# Patient Record
Sex: FEMALE | Race: WHITE | NOT HISPANIC OR LATINO
[De-identification: names, ages, dates, MRNs, and addresses within clinical notes are randomized per-mention and may not be internally consistent; named-entity substitution may affect disease eponyms.]

---

## 2017-04-26 ENCOUNTER — APPOINTMENT (OUTPATIENT)
Dept: DERMATOLOGY | Facility: CLINIC | Age: 82
End: 2017-04-26

## 2017-04-26 VITALS — HEIGHT: 64 IN | WEIGHT: 132 LBS | BODY MASS INDEX: 22.53 KG/M2

## 2017-04-26 DIAGNOSIS — R76.8 OTHER SPECIFIED ABNORMAL IMMUNOLOGICAL FINDINGS IN SERUM: ICD-10-CM

## 2017-04-26 DIAGNOSIS — Z78.9 OTHER SPECIFIED HEALTH STATUS: ICD-10-CM

## 2017-04-26 DIAGNOSIS — Z85.828 PERSONAL HISTORY OF OTHER MALIGNANT NEOPLASM OF SKIN: ICD-10-CM

## 2017-12-01 ENCOUNTER — APPOINTMENT (OUTPATIENT)
Dept: DERMATOLOGY | Facility: CLINIC | Age: 82
End: 2017-12-01
Payer: MEDICARE

## 2017-12-01 PROCEDURE — 99214 OFFICE O/P EST MOD 30 MIN: CPT

## 2017-12-01 RX ORDER — NAPROXEN 250 MG/1
250 TABLET ORAL
Qty: 20 | Refills: 0 | Status: DISCONTINUED | COMMUNITY
Start: 2017-07-19

## 2017-12-01 RX ORDER — AMLODIPINE BESYLATE 10 MG/1
10 TABLET ORAL
Qty: 30 | Refills: 0 | Status: DISCONTINUED | COMMUNITY
Start: 2017-06-12

## 2017-12-01 RX ORDER — SULFAMETHOXAZOLE AND TRIMETHOPRIM 800; 160 MG/1; MG/1
800-160 TABLET ORAL
Qty: 20 | Refills: 0 | Status: DISCONTINUED | COMMUNITY
Start: 2017-11-21 | End: 2017-12-01

## 2017-12-01 RX ORDER — SODIUM SULFATE, POTASSIUM SULFATE, MAGNESIUM SULFATE 17.5; 3.13; 1.6 G/ML; G/ML; G/ML
17.5-3.13-1.6 SOLUTION, CONCENTRATE ORAL
Qty: 354 | Refills: 0 | Status: DISCONTINUED | COMMUNITY
Start: 2017-06-15

## 2017-12-04 ENCOUNTER — APPOINTMENT (OUTPATIENT)
Dept: DERMATOLOGY | Facility: CLINIC | Age: 82
End: 2017-12-04
Payer: MEDICARE

## 2017-12-04 DIAGNOSIS — L27.0 GENERALIZED SKIN ERUPTION DUE TO DRUGS AND MEDICAMENTS TAKEN INTERNALLY: ICD-10-CM

## 2017-12-04 PROCEDURE — 99213 OFFICE O/P EST LOW 20 MIN: CPT

## 2017-12-04 RX ORDER — BACITRACIN 500 [IU]/G
500 OINTMENT TOPICAL
Qty: 30 | Refills: 0 | Status: DISCONTINUED | COMMUNITY
Start: 2017-11-21 | End: 2017-12-04

## 2017-12-04 RX ORDER — TRIAMCINOLONE ACETONIDE 0.25 MG/G
0.03 CREAM TOPICAL
Qty: 1 | Refills: 0 | Status: DISCONTINUED | COMMUNITY
Start: 2017-12-01 | End: 2017-12-04

## 2018-07-27 PROBLEM — Z78.9 ALCOHOL USE: Status: ACTIVE | Noted: 2017-04-26

## 2018-07-27 PROBLEM — Z85.828 HISTORY OF BASAL CELL CARCINOMA: Status: RESOLVED | Noted: 2017-04-26 | Resolved: 2018-07-27

## 2018-10-09 ENCOUNTER — INPATIENT (INPATIENT)
Facility: HOSPITAL | Age: 83
LOS: 1 days | Discharge: HOME | End: 2018-10-11
Attending: SURGERY | Admitting: SURGERY
Payer: COMMERCIAL

## 2018-10-09 VITALS
OXYGEN SATURATION: 98 % | DIASTOLIC BLOOD PRESSURE: 81 MMHG | HEART RATE: 95 BPM | TEMPERATURE: 98 F | RESPIRATION RATE: 18 BRPM | SYSTOLIC BLOOD PRESSURE: 146 MMHG | WEIGHT: 130.07 LBS

## 2018-10-09 DIAGNOSIS — S22.20XA UNSPECIFIED FRACTURE OF STERNUM, INITIAL ENCOUNTER FOR CLOSED FRACTURE: ICD-10-CM

## 2018-10-09 LAB
ABO RH CONFIRMATION: SIGNIFICANT CHANGE UP
ALBUMIN SERPL ELPH-MCNC: 3.6 G/DL — SIGNIFICANT CHANGE UP (ref 3.3–5)
ALP SERPL-CCNC: 74 U/L — SIGNIFICANT CHANGE UP (ref 40–120)
ALT FLD-CCNC: 29 U/L — SIGNIFICANT CHANGE UP (ref 12–78)
ANION GAP SERPL CALC-SCNC: 7 MMOL/L — SIGNIFICANT CHANGE UP (ref 5–17)
ANION GAP SERPL CALC-SCNC: 7 MMOL/L — SIGNIFICANT CHANGE UP (ref 5–17)
APTT BLD: 29.8 SEC — SIGNIFICANT CHANGE UP (ref 27.5–37.4)
AST SERPL-CCNC: 33 U/L — SIGNIFICANT CHANGE UP (ref 15–37)
BASOPHILS # BLD AUTO: 0.13 K/UL — SIGNIFICANT CHANGE UP (ref 0–0.2)
BASOPHILS NFR BLD AUTO: 0.8 % — SIGNIFICANT CHANGE UP (ref 0–2)
BILIRUB SERPL-MCNC: 0.8 MG/DL — SIGNIFICANT CHANGE UP (ref 0.2–1.2)
BLD GP AB SCN SERPL QL: SIGNIFICANT CHANGE UP
BUN SERPL-MCNC: 14 MG/DL — SIGNIFICANT CHANGE UP (ref 7–23)
BUN SERPL-MCNC: 14 MG/DL — SIGNIFICANT CHANGE UP (ref 7–23)
CALCIUM SERPL-MCNC: 8.9 MG/DL — SIGNIFICANT CHANGE UP (ref 8.5–10.1)
CALCIUM SERPL-MCNC: 9.3 MG/DL — SIGNIFICANT CHANGE UP (ref 8.5–10.1)
CHLORIDE SERPL-SCNC: 106 MMOL/L — SIGNIFICANT CHANGE UP (ref 96–108)
CHLORIDE SERPL-SCNC: 106 MMOL/L — SIGNIFICANT CHANGE UP (ref 96–108)
CK SERPL-CCNC: 189 U/L — SIGNIFICANT CHANGE UP (ref 26–192)
CO2 SERPL-SCNC: 29 MMOL/L — SIGNIFICANT CHANGE UP (ref 22–31)
CO2 SERPL-SCNC: 30 MMOL/L — SIGNIFICANT CHANGE UP (ref 22–31)
CREAT SERPL-MCNC: 0.91 MG/DL — SIGNIFICANT CHANGE UP (ref 0.5–1.3)
CREAT SERPL-MCNC: 0.95 MG/DL — SIGNIFICANT CHANGE UP (ref 0.5–1.3)
EOSINOPHIL # BLD AUTO: 0.5 K/UL — SIGNIFICANT CHANGE UP (ref 0–0.5)
EOSINOPHIL NFR BLD AUTO: 3.2 % — SIGNIFICANT CHANGE UP (ref 0–6)
GLUCOSE SERPL-MCNC: 124 MG/DL — HIGH (ref 70–99)
GLUCOSE SERPL-MCNC: 92 MG/DL — SIGNIFICANT CHANGE UP (ref 70–99)
HCT VFR BLD CALC: 43.9 % — SIGNIFICANT CHANGE UP (ref 34.5–45)
HCT VFR BLD CALC: 45.9 % — HIGH (ref 34.5–45)
HGB BLD-MCNC: 14.4 G/DL — SIGNIFICANT CHANGE UP (ref 11.5–15.5)
HGB BLD-MCNC: 14.9 G/DL — SIGNIFICANT CHANGE UP (ref 11.5–15.5)
IMM GRANULOCYTES NFR BLD AUTO: 1.1 % — SIGNIFICANT CHANGE UP (ref 0–1.5)
INR BLD: 1.04 RATIO — SIGNIFICANT CHANGE UP (ref 0.88–1.16)
LYMPHOCYTES # BLD AUTO: 1.82 K/UL — SIGNIFICANT CHANGE UP (ref 1–3.3)
LYMPHOCYTES # BLD AUTO: 11.5 % — LOW (ref 13–44)
MAGNESIUM SERPL-MCNC: 2.1 MG/DL — SIGNIFICANT CHANGE UP (ref 1.6–2.6)
MCHC RBC-ENTMCNC: 31.2 PG — SIGNIFICANT CHANGE UP (ref 27–34)
MCHC RBC-ENTMCNC: 31.7 PG — SIGNIFICANT CHANGE UP (ref 27–34)
MCHC RBC-ENTMCNC: 32.5 GM/DL — SIGNIFICANT CHANGE UP (ref 32–36)
MCHC RBC-ENTMCNC: 32.8 GM/DL — SIGNIFICANT CHANGE UP (ref 32–36)
MCV RBC AUTO: 95 FL — SIGNIFICANT CHANGE UP (ref 80–100)
MCV RBC AUTO: 97.7 FL — SIGNIFICANT CHANGE UP (ref 80–100)
MONOCYTES # BLD AUTO: 0.78 K/UL — SIGNIFICANT CHANGE UP (ref 0–0.9)
MONOCYTES NFR BLD AUTO: 4.9 % — SIGNIFICANT CHANGE UP (ref 2–14)
NEUTROPHILS # BLD AUTO: 12.41 K/UL — HIGH (ref 1.8–7.4)
NEUTROPHILS NFR BLD AUTO: 78.5 % — HIGH (ref 43–77)
NRBC # BLD: 0 /100 WBCS — SIGNIFICANT CHANGE UP (ref 0–0)
NRBC # BLD: 0 /100 WBCS — SIGNIFICANT CHANGE UP (ref 0–0)
PHOSPHATE SERPL-MCNC: 3.2 MG/DL — SIGNIFICANT CHANGE UP (ref 2.5–4.5)
PLATELET # BLD AUTO: 330 K/UL — SIGNIFICANT CHANGE UP (ref 150–400)
PLATELET # BLD AUTO: 334 K/UL — SIGNIFICANT CHANGE UP (ref 150–400)
POTASSIUM SERPL-MCNC: 3.9 MMOL/L — SIGNIFICANT CHANGE UP (ref 3.5–5.3)
POTASSIUM SERPL-MCNC: 4.3 MMOL/L — SIGNIFICANT CHANGE UP (ref 3.5–5.3)
POTASSIUM SERPL-SCNC: 3.9 MMOL/L — SIGNIFICANT CHANGE UP (ref 3.5–5.3)
POTASSIUM SERPL-SCNC: 4.3 MMOL/L — SIGNIFICANT CHANGE UP (ref 3.5–5.3)
PROT SERPL-MCNC: 7.2 GM/DL — SIGNIFICANT CHANGE UP (ref 6–8.3)
PROTHROM AB SERPL-ACNC: 11.2 SEC — SIGNIFICANT CHANGE UP (ref 9.8–12.7)
RBC # BLD: 4.62 M/UL — SIGNIFICANT CHANGE UP (ref 3.8–5.2)
RBC # BLD: 4.7 M/UL — SIGNIFICANT CHANGE UP (ref 3.8–5.2)
RBC # FLD: 13.9 % — SIGNIFICANT CHANGE UP (ref 10.3–14.5)
RBC # FLD: 14 % — SIGNIFICANT CHANGE UP (ref 10.3–14.5)
SODIUM SERPL-SCNC: 142 MMOL/L — SIGNIFICANT CHANGE UP (ref 135–145)
SODIUM SERPL-SCNC: 143 MMOL/L — SIGNIFICANT CHANGE UP (ref 135–145)
TROPONIN I SERPL-MCNC: <0.015 NG/ML — SIGNIFICANT CHANGE UP (ref 0.01–0.04)
TROPONIN I SERPL-MCNC: <0.015 NG/ML — SIGNIFICANT CHANGE UP (ref 0.01–0.04)
TYPE + AB SCN PNL BLD: SIGNIFICANT CHANGE UP
WBC # BLD: 12.68 K/UL — HIGH (ref 3.8–10.5)
WBC # BLD: 15.81 K/UL — HIGH (ref 3.8–10.5)
WBC # FLD AUTO: 12.68 K/UL — HIGH (ref 3.8–10.5)
WBC # FLD AUTO: 15.81 K/UL — HIGH (ref 3.8–10.5)

## 2018-10-09 PROCEDURE — 73130 X-RAY EXAM OF HAND: CPT | Mod: 26,LT

## 2018-10-09 PROCEDURE — 99285 EMERGENCY DEPT VISIT HI MDM: CPT

## 2018-10-09 PROCEDURE — 93010 ELECTROCARDIOGRAM REPORT: CPT

## 2018-10-09 PROCEDURE — 72170 X-RAY EXAM OF PELVIS: CPT | Mod: 26

## 2018-10-09 PROCEDURE — 71250 CT THORAX DX C-: CPT | Mod: 26

## 2018-10-09 RX ORDER — ENOXAPARIN SODIUM 100 MG/ML
40 INJECTION SUBCUTANEOUS DAILY
Qty: 0 | Refills: 0 | Status: DISCONTINUED | OUTPATIENT
Start: 2018-10-09 | End: 2018-10-11

## 2018-10-09 RX ORDER — OXYCODONE AND ACETAMINOPHEN 5; 325 MG/1; MG/1
1 TABLET ORAL EVERY 6 HOURS
Qty: 0 | Refills: 0 | Status: DISCONTINUED | OUTPATIENT
Start: 2018-10-09 | End: 2018-10-11

## 2018-10-09 RX ORDER — MORPHINE SULFATE 50 MG/1
2 CAPSULE, EXTENDED RELEASE ORAL EVERY 4 HOURS
Qty: 0 | Refills: 0 | Status: DISCONTINUED | OUTPATIENT
Start: 2018-10-09 | End: 2018-10-11

## 2018-10-09 RX ORDER — TETANUS TOXOID, REDUCED DIPHTHERIA TOXOID AND ACELLULAR PERTUSSIS VACCINE, ADSORBED 5; 2.5; 8; 8; 2.5 [IU]/.5ML; [IU]/.5ML; UG/.5ML; UG/.5ML; UG/.5ML
0.5 SUSPENSION INTRAMUSCULAR ONCE
Qty: 0 | Refills: 0 | Status: COMPLETED | OUTPATIENT
Start: 2018-10-09 | End: 2018-10-09

## 2018-10-09 RX ORDER — ACETAMINOPHEN 500 MG
650 TABLET ORAL ONCE
Qty: 0 | Refills: 0 | Status: COMPLETED | OUTPATIENT
Start: 2018-10-09 | End: 2018-10-09

## 2018-10-09 RX ORDER — LIDOCAINE 4 G/100G
1 CREAM TOPICAL DAILY
Qty: 0 | Refills: 0 | Status: DISCONTINUED | OUTPATIENT
Start: 2018-10-09 | End: 2018-10-11

## 2018-10-09 RX ORDER — PANTOPRAZOLE SODIUM 20 MG/1
40 TABLET, DELAYED RELEASE ORAL DAILY
Qty: 0 | Refills: 0 | Status: DISCONTINUED | OUTPATIENT
Start: 2018-10-09 | End: 2018-10-11

## 2018-10-09 RX ORDER — IBUPROFEN 200 MG
600 TABLET ORAL EVERY 8 HOURS
Qty: 0 | Refills: 0 | Status: DISCONTINUED | OUTPATIENT
Start: 2018-10-09 | End: 2018-10-11

## 2018-10-09 RX ADMIN — TETANUS TOXOID, REDUCED DIPHTHERIA TOXOID AND ACELLULAR PERTUSSIS VACCINE, ADSORBED 0.5 MILLILITER(S): 5; 2.5; 8; 8; 2.5 SUSPENSION INTRAMUSCULAR at 13:37

## 2018-10-09 RX ADMIN — Medication 650 MILLIGRAM(S): at 14:01

## 2018-10-09 RX ADMIN — Medication 650 MILLIGRAM(S): at 13:30

## 2018-10-09 NOTE — ED PROVIDER NOTE - SKIN, MLM
+right hand second MCP joint abrasion +left hand ecchymosis and swelling to fourth and fifth MCP joint
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
## 2018-10-09 NOTE — H&P ADULT - NSHPREVIEWOFSYSTEMS_GEN_ALL_CORE
+chest pain  +sob  +hand pain and swelling ROS:.  [X] A ten-point review of systems was otherwise negative except as noted.  Systemic:	[ ] Fever	[ ] Chills	[ ] Night sweats    [ ] Fatigue	[ ] Other  [] Cardiovascular:  [] Pulmonary:  [] Renal/Urologic:  [] Gastrointestinal: abdominal pain, vomiting  [] Metabolic:  [] Neurologic:  [] Hematologic:  [] ENT:  [] Ophthalmologic:  [] Musculoskeletal:    [ ] Due to altered mental status/intubation, subjective information were not able to be obtained from the patient. History was obtained, to the extent possible, from review of the chart and collateral sources of information.  +chest pain  +sob  +hand pain and swelling
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
## 2018-10-09 NOTE — ED ADULT NURSE NOTE - NSIMPLEMENTINTERV_GEN_ALL_ED
Implemented All Fall with Harm Risk Interventions:  [place] to call system. Call bell, personal items and telephone within reach. Instruct patient to call for assistance. Room bathroom lighting operational. Non-slip footwear when patient is off stretcher. Physically safe environment: no spills, clutter or unnecessary equipment. Stretcher in lowest position, wheels locked, appropriate side rails in place. Provide visual cue, wrist band, yellow gown, etc. Monitor gait and stability. Monitor for mental status changes and reorient to person, place, and time. Review medications for side effects contributing to fall risk. Reinforce activity limits and safety measures with patient and family. Provide visual clues: red socks.
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
## 2018-10-09 NOTE — ED ADULT NURSE REASSESSMENT NOTE - COMFORT CARE
plan of care explained
surgery at bedside with patient and . poc provided/plan of care explained
plan of care explained
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
## 2018-10-10 LAB
ANION GAP SERPL CALC-SCNC: 8 MMOL/L — SIGNIFICANT CHANGE UP (ref 5–17)
BUN SERPL-MCNC: 14 MG/DL — SIGNIFICANT CHANGE UP (ref 7–23)
CALCIUM SERPL-MCNC: 8.6 MG/DL — SIGNIFICANT CHANGE UP (ref 8.5–10.1)
CHLORIDE SERPL-SCNC: 107 MMOL/L — SIGNIFICANT CHANGE UP (ref 96–108)
CO2 SERPL-SCNC: 27 MMOL/L — SIGNIFICANT CHANGE UP (ref 22–31)
CREAT SERPL-MCNC: 0.78 MG/DL — SIGNIFICANT CHANGE UP (ref 0.5–1.3)
GLUCOSE SERPL-MCNC: 95 MG/DL — SIGNIFICANT CHANGE UP (ref 70–99)
HCT VFR BLD CALC: 42.9 % — SIGNIFICANT CHANGE UP (ref 34.5–45)
HGB BLD-MCNC: 13.7 G/DL — SIGNIFICANT CHANGE UP (ref 11.5–15.5)
MCHC RBC-ENTMCNC: 31.1 PG — SIGNIFICANT CHANGE UP (ref 27–34)
MCHC RBC-ENTMCNC: 31.9 GM/DL — LOW (ref 32–36)
MCV RBC AUTO: 97.5 FL — SIGNIFICANT CHANGE UP (ref 80–100)
NRBC # BLD: 0 /100 WBCS — SIGNIFICANT CHANGE UP (ref 0–0)
PLATELET # BLD AUTO: 296 K/UL — SIGNIFICANT CHANGE UP (ref 150–400)
POTASSIUM SERPL-MCNC: 3.8 MMOL/L — SIGNIFICANT CHANGE UP (ref 3.5–5.3)
POTASSIUM SERPL-SCNC: 3.8 MMOL/L — SIGNIFICANT CHANGE UP (ref 3.5–5.3)
RBC # BLD: 4.4 M/UL — SIGNIFICANT CHANGE UP (ref 3.8–5.2)
RBC # FLD: 13.9 % — SIGNIFICANT CHANGE UP (ref 10.3–14.5)
SODIUM SERPL-SCNC: 142 MMOL/L — SIGNIFICANT CHANGE UP (ref 135–145)
TROPONIN I SERPL-MCNC: <0.015 NG/ML — SIGNIFICANT CHANGE UP (ref 0.01–0.04)
TROPONIN I SERPL-MCNC: <0.015 NG/ML — SIGNIFICANT CHANGE UP (ref 0.01–0.04)
WBC # BLD: 10.52 K/UL — HIGH (ref 3.8–10.5)
WBC # FLD AUTO: 10.52 K/UL — HIGH (ref 3.8–10.5)

## 2018-10-10 PROCEDURE — 71045 X-RAY EXAM CHEST 1 VIEW: CPT | Mod: 26

## 2018-10-10 PROCEDURE — 93306 TTE W/DOPPLER COMPLETE: CPT | Mod: 26

## 2018-10-10 PROCEDURE — 73140 X-RAY EXAM OF FINGER(S): CPT | Mod: 26,LT

## 2018-10-10 PROCEDURE — 93010 ELECTROCARDIOGRAM REPORT: CPT

## 2018-10-10 RX ORDER — ACETAMINOPHEN 500 MG
650 TABLET ORAL EVERY 6 HOURS
Qty: 0 | Refills: 0 | Status: DISCONTINUED | OUTPATIENT
Start: 2018-10-10 | End: 2018-10-11

## 2018-10-10 RX ADMIN — Medication 650 MILLIGRAM(S): at 00:43

## 2018-10-10 RX ADMIN — PANTOPRAZOLE SODIUM 40 MILLIGRAM(S): 20 TABLET, DELAYED RELEASE ORAL at 12:46

## 2018-10-10 RX ADMIN — Medication 650 MILLIGRAM(S): at 01:42

## 2018-10-10 RX ADMIN — LIDOCAINE 1 PATCH: 4 CREAM TOPICAL at 12:46

## 2018-10-10 RX ADMIN — ENOXAPARIN SODIUM 40 MILLIGRAM(S): 100 INJECTION SUBCUTANEOUS at 12:46

## 2018-10-10 NOTE — PHYSICAL THERAPY INITIAL EVALUATION ADULT - GENERAL OBSERVATIONS, REHAB EVAL
HM; L hand splinted; pt rec'd seated in chair; HR 98; O2 Sat 98%; pain level 5/10; RN Pili made aware
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
## 2018-10-10 NOTE — PHYSICAL THERAPY INITIAL EVALUATION ADULT - MODALITIES TREATMENT COMMENTS
pt left seated in chair post Eval; chair alarm donned; HM in place; callbell in reach; pt instructed not to get up alone; call nursing for assist; ezequiel well; pain persists; RN Pili mari
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
## 2018-10-10 NOTE — PHYSICAL THERAPY INITIAL EVALUATION ADULT - CRITERIA FOR SKILLED THERAPEUTIC INTERVENTIONS
pt does not require skilled PT intervention @ this time; recommend daily OOB / amb as ezequiel on nursing floor care
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
## 2018-10-11 ENCOUNTER — TRANSCRIPTION ENCOUNTER (OUTPATIENT)
Age: 83
End: 2018-10-11

## 2018-10-11 VITALS
TEMPERATURE: 99 F | DIASTOLIC BLOOD PRESSURE: 67 MMHG | OXYGEN SATURATION: 97 % | SYSTOLIC BLOOD PRESSURE: 122 MMHG | HEART RATE: 102 BPM | RESPIRATION RATE: 17 BRPM

## 2018-10-11 RX ORDER — LIDOCAINE 4 G/100G
1 CREAM TOPICAL
Qty: 0 | Refills: 0 | COMMUNITY
Start: 2018-10-11

## 2018-10-11 RX ORDER — IBUPROFEN 200 MG
1 TABLET ORAL
Qty: 0 | Refills: 0 | COMMUNITY
Start: 2018-10-11

## 2018-10-11 RX ORDER — OXYCODONE HYDROCHLORIDE 5 MG/1
1 TABLET ORAL
Qty: 20 | Refills: 0 | OUTPATIENT
Start: 2018-10-11 | End: 2018-10-15

## 2018-10-11 RX ADMIN — LIDOCAINE 1 PATCH: 4 CREAM TOPICAL at 13:04

## 2018-10-11 RX ADMIN — LIDOCAINE 1 PATCH: 4 CREAM TOPICAL at 01:00

## 2018-10-11 NOTE — DISCHARGE NOTE ADULT - NS AS ACTIVITY OBS
No Heavy lifting/straining/Showering allowed/Walking-Indoors allowed/Stairs allowed/Do not drive or operate machinery/Walking-Outdoors allowed
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
## 2018-10-11 NOTE — DISCHARGE NOTE ADULT - PLAN OF CARE
pain control Seek medical attention of develops worsening headache, nausea, vomiting, seizure, any focal neurological complaint, Pain not controlled with medication, difficulty breathing or fever. No Heavy lifting, pushing, pulling or excessive physical activity for 4 weeks. Incentive spirometry. Fall precautions. call offices for follow up appointments. Follow up with surgery if pain is not not better in 4 weeks. healing keep splint in place, keep left arm elevated, follow up with Dr Bell.

## 2018-10-11 NOTE — DISCHARGE NOTE ADULT - MEDICATION SUMMARY - MEDICATIONS TO TAKE
I will START or STAY ON the medications listed below when I get home from the hospital:    ibuprofen 600 mg oral tablet  -- 1 tab(s) by mouth every 8 hours, As needed, Mild Pain (1 - 3)  -- Indication: For pain    Tylenol 325 mg oral tablet  -- 2 tab(s) by mouth every 4 hours  -- Indication: For pain    oxyCODONE 5 mg oral tablet  -- 1 tab(s) by mouth every 6 hours, As Needed  -- Indication: For pain    lidocaine 5% topical film  -- Apply on skin to affected area once a day 12 hour son 12 hours off  -- Indication: For pain    Dulcolax Stool Softener 100 mg oral capsule  -- 1 cap(s) by mouth 2 times a day  -- Indication: For Constipatiin

## 2018-10-11 NOTE — DISCHARGE NOTE ADULT - CARE PROVIDER_API CALL
Margie Mccollum), Plastic Surgery; Surgery  [address]  [address]  [place] [address]  Phone: [phone]  Fax: [phone]    Jennifer Wilcox), Surgery; Surgical Critical Care  [address]  [address]  [place] [address]  Phone: [phone]  Fax: [phone]    PMD,   Phone: (   )    -  Fax: (   )    -

## 2018-10-11 NOTE — DISCHARGE NOTE ADULT - HOSPITAL COURSE
Pt with sternal fracture, left metacarpal fracture, pain controlled O2 sat stable, follow up with hand surgery

## 2018-10-11 NOTE — DISCHARGE NOTE ADULT - PATIENT PORTAL LINK FT
You can access the [facility] Patient Portal, offered by [facility], by registering with the following website: [email or web address]

## 2018-10-11 NOTE — DISCHARGE NOTE ADULT - CARE PLAN
no Principal Discharge DX:	Sternal fracture  Goal:	pain control  Assessment and plan of treatment:	Seek medical attention of develops worsening headache, nausea, vomiting, seizure, any focal neurological complaint, Pain not controlled with medication, difficulty breathing or fever. No Heavy lifting, pushing, pulling or excessive physical activity for 4 weeks. Incentive spirometry. Fall precautions. call offices for follow up appointments. Follow up with surgery if pain is not not better in 4 weeks.  Secondary Diagnosis:	Closed nondisplaced fracture of fifth metacarpal bone, unspecified portion of metacarpal, unspecified laterality, initial encounter  Goal:	healing  Assessment and plan of treatment:	keep splint in place, keep left arm elevated, follow up with Dr Bell.
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
## 2018-10-17 DIAGNOSIS — S22.20XA UNSPECIFIED FRACTURE OF STERNUM, INITIAL ENCOUNTER FOR CLOSED FRACTURE: ICD-10-CM

## 2018-10-17 DIAGNOSIS — E78.5 HYPERLIPIDEMIA, UNSPECIFIED: ICD-10-CM
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
## 2018-10-17 DIAGNOSIS — S62.307A UNSPECIFIED FRACTURE OF FIFTH METACARPAL BONE, LEFT HAND, INITIAL ENCOUNTER FOR CLOSED FRACTURE: ICD-10-CM
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
## 2018-10-25 ENCOUNTER — OUTPATIENT (OUTPATIENT)
Dept: OUTPATIENT SERVICES | Facility: HOSPITAL | Age: 83
LOS: 1 days | Discharge: HOME | End: 2018-10-25

## 2018-10-25 VITALS
HEIGHT: 65 IN | TEMPERATURE: 97 F | OXYGEN SATURATION: 97 % | HEART RATE: 88 BPM | RESPIRATION RATE: 16 BRPM | WEIGHT: 125 LBS | DIASTOLIC BLOOD PRESSURE: 79 MMHG | SYSTOLIC BLOOD PRESSURE: 146 MMHG

## 2018-10-25 VITALS
DIASTOLIC BLOOD PRESSURE: 95 MMHG | RESPIRATION RATE: 16 BRPM | HEART RATE: 100 BPM | OXYGEN SATURATION: 99 % | SYSTOLIC BLOOD PRESSURE: 159 MMHG

## 2018-10-25 DIAGNOSIS — Z98.890 OTHER SPECIFIED POSTPROCEDURAL STATES: Chronic | ICD-10-CM

## 2018-10-25 RX ORDER — DOCUSATE SODIUM 100 MG
1 CAPSULE ORAL
Qty: 0 | Refills: 0 | COMMUNITY

## 2018-10-25 RX ORDER — FENTANYL CITRATE 50 UG/ML
25 INJECTION INTRAVENOUS
Qty: 0 | Refills: 0 | Status: DISCONTINUED | OUTPATIENT
Start: 2018-10-25 | End: 2018-10-25

## 2018-10-25 RX ORDER — ACETAMINOPHEN 500 MG
565 TABLET ORAL EVERY 4 HOURS
Qty: 0 | Refills: 0 | Status: DISCONTINUED | OUTPATIENT
Start: 2018-10-25 | End: 2018-11-09

## 2018-10-25 RX ORDER — SODIUM CHLORIDE 9 MG/ML
1000 INJECTION, SOLUTION INTRAVENOUS
Qty: 0 | Refills: 0 | Status: DISCONTINUED | OUTPATIENT
Start: 2018-10-25 | End: 2018-10-25

## 2018-10-25 RX ORDER — OXYCODONE HYDROCHLORIDE 5 MG/1
5 TABLET ORAL ONCE
Qty: 0 | Refills: 0 | Status: DISCONTINUED | OUTPATIENT
Start: 2018-10-25 | End: 2018-10-25

## 2018-10-25 RX ORDER — ACETAMINOPHEN 500 MG
2 TABLET ORAL
Qty: 0 | Refills: 0 | COMMUNITY

## 2018-10-25 RX ORDER — ACETAMINOPHEN 500 MG
1000 TABLET ORAL ONCE
Qty: 0 | Refills: 0 | Status: COMPLETED | OUTPATIENT
Start: 2018-10-25 | End: 2018-10-25

## 2018-10-25 RX ORDER — ONDANSETRON 8 MG/1
4 TABLET, FILM COATED ORAL ONCE
Qty: 0 | Refills: 0 | Status: DISCONTINUED | OUTPATIENT
Start: 2018-10-25 | End: 2018-10-25

## 2018-10-25 RX ORDER — MEPERIDINE HYDROCHLORIDE 50 MG/ML
12.5 INJECTION INTRAMUSCULAR; INTRAVENOUS; SUBCUTANEOUS
Qty: 0 | Refills: 0 | Status: DISCONTINUED | OUTPATIENT
Start: 2018-10-25 | End: 2018-10-25

## 2018-10-25 RX ORDER — OXYCODONE HYDROCHLORIDE 5 MG/1
1 TABLET ORAL
Qty: 0 | Refills: 0 | COMMUNITY

## 2018-10-25 RX ADMIN — Medication 1000 MILLIGRAM(S): at 12:53

## 2018-10-25 RX ADMIN — Medication 400 MILLIGRAM(S): at 12:48

## 2018-10-25 RX ADMIN — SODIUM CHLORIDE 75 MILLILITER(S): 9 INJECTION, SOLUTION INTRAVENOUS at 12:51

## 2018-10-25 NOTE — BRIEF OPERATIVE NOTE - PROCEDURE
<<-----Click on this checkbox to enter Procedure Fracture repair  10/25/2018  [address]  Active  [facility]

## 2018-10-25 NOTE — ASU DISCHARGE PLAN (ADULT/PEDIATRIC). - SPECIAL INSTRUCTIONS
1. DO NOT use limb; elevate limb at all times  2. Keep dressing dry  3. Call MD to make follow-up appointment for next week

## 2018-10-25 NOTE — PROGRESS NOTE ADULT - SUBJECTIVE AND OBJECTIVE BOX
Patient's case discussed with Dr. Aragon (anesthesia) on Oct 22, 2018.  Patient was cleared for OR today from an anesthesia perspective; recent admission.  Data reviewed.

## 2018-10-25 NOTE — ASU DISCHARGE PLAN (ADULT/PEDIATRIC). - NOTIFY
Excessive Diarrhea/Fever greater than 101/Bleeding that does not stop/Persistent Nausea and Vomiting/Numbness, color, or temperature change to extremity/Unable to Urinate
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
## 2018-11-05 DIAGNOSIS — S62.337A DISPLACED FRACTURE OF NECK OF FIFTH METACARPAL BONE, LEFT HAND, INITIAL ENCOUNTER FOR CLOSED FRACTURE: ICD-10-CM
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
## 2018-12-03 ENCOUNTER — APPOINTMENT (OUTPATIENT)
Dept: DERMATOLOGY | Facility: CLINIC | Age: 83
End: 2018-12-03

## 2018-12-22 NOTE — ASU PATIENT PROFILE, ADULT - BLOOD AVOIDANCE/RESTRICTIONS, PROFILE
Sushil Da Silva), Otolaryngology  [address] [place] [address]  Phone: [phone]  Fax: [phone] none

## 2019-03-18 ENCOUNTER — APPOINTMENT (OUTPATIENT)
Dept: DERMATOLOGY | Facility: CLINIC | Age: 84
End: 2019-03-18
Payer: MEDICARE

## 2019-03-18 VITALS — HEIGHT: 64 IN | WEIGHT: 125 LBS | BODY MASS INDEX: 21.34 KG/M2

## 2019-03-18 DIAGNOSIS — L57.0 ACTINIC KERATOSIS: ICD-10-CM

## 2019-03-18 PROCEDURE — 17003 DESTRUCT PREMALG LES 2-14: CPT

## 2019-03-18 PROCEDURE — 17000 DESTRUCT PREMALG LESION: CPT

## 2019-03-18 PROCEDURE — 99213 OFFICE O/P EST LOW 20 MIN: CPT | Mod: 25

## 2019-03-18 NOTE — PHYSICAL EXAM
[Alert] : alert [Oriented x 3] : ~L oriented x 3 [Well Nourished] : well nourished [Full Body Skin Exam Performed] : performed [Eyelids] : Eyelids [Ears] : Ears [Lips] : Lips [Neck] : Neck [FreeTextEntry3] : A full skin exam was performed including the scalp, face, neck, chest, abdomen, back, buttocks, upper extremities and lower extremities.  The patient declined examination of the breasts and genitalia.  \par The exam did not reveal any evidence of skin cancer, showing only the following benign growths:\par [place] pigmented nevi.\par Seborrheic keratoses.\par \par Keratotic papules, left cheek and right cheek.\par

## 2019-03-18 NOTE — HISTORY OF PRESENT ILLNESS
[FreeTextEntry1] : Patient presents for skin examination. [de-identified] : Denies new, changing, bleeding or tender lesions on the skin over the past year.\par , Oziel,  in .

## 2019-07-07 PROBLEM — L27.0 ERUPTION DUE TO DRUG: Status: ACTIVE | Noted: 2017-12-01

## 2019-10-23 ENCOUNTER — APPOINTMENT (OUTPATIENT)
Dept: DERMATOLOGY | Facility: CLINIC | Age: 84
End: 2019-10-23
Payer: MEDICARE

## 2019-10-23 VITALS — BODY MASS INDEX: 21.63 KG/M2 | WEIGHT: 126 LBS

## 2019-10-23 DIAGNOSIS — D22.9 MELANOCYTIC NEVI, UNSPECIFIED: ICD-10-CM

## 2019-10-23 PROCEDURE — 99213 OFFICE O/P EST LOW 20 MIN: CPT

## 2019-10-23 NOTE — PHYSICAL EXAM
[Oriented x 3] : ~L oriented x 3 [Alert] : alert [Full Body Skin Exam Performed] : performed [Well Nourished] : well nourished [FreeTextEntry3] : A full skin exam was performed including the scalp, face (including the lips, ears, nose and eyes), neck, chest, breasts, abdomen, back, buttocks, upper extremities and lower extremities.  The patient declined examination of the genitalia.  \par The exam revealed the following benign growths:\par [place] pigmented nevi.\par Seborrheic keratoses.\par \par

## 2019-10-23 NOTE — HISTORY OF PRESENT ILLNESS
[FreeTextEntry1] : Patient presents for skin examination. [de-identified] : Denies new, changing, bleeding or tender lesions on the skin over the past 6 months.\par

## 2020-07-28 ENCOUNTER — APPOINTMENT (OUTPATIENT)
Dept: DERMATOLOGY | Facility: CLINIC | Age: 85
End: 2020-07-28
Payer: MEDICARE

## 2020-07-28 DIAGNOSIS — L82.1 OTHER SEBORRHEIC KERATOSIS: ICD-10-CM

## 2020-07-28 PROCEDURE — 99213 OFFICE O/P EST LOW 20 MIN: CPT

## 2020-07-29 PROBLEM — L82.1 SEBORRHEIC KERATOSIS: Status: ACTIVE | Noted: 2017-04-26

## 2022-11-30 NOTE — ASU PATIENT PROFILE, ADULT - NS SC CAGE ALCOHOL EYE OPENER
Patient sister May called to cancel patient Nephrology Consult appointment ref by Dr Kellee Murrell for CKD because of patient having hip surgery done  Patient sister stated that she will call us back when patient is discharged from her hip surgery  Patient sister understood and was okay with it  no

## 2023-02-17 ENCOUNTER — INPATIENT (INPATIENT)
Facility: HOSPITAL | Age: 88
LOS: 2 days | Discharge: HOME | DRG: 603 | End: 2023-02-20
Attending: FAMILY MEDICINE | Admitting: STUDENT IN AN ORGANIZED HEALTH CARE EDUCATION/TRAINING PROGRAM
Payer: MEDICARE

## 2023-02-17 VITALS — HEIGHT: 65 IN | WEIGHT: 130.07 LBS

## 2023-02-17 DIAGNOSIS — L03.115 CELLULITIS OF RIGHT LOWER LIMB: ICD-10-CM

## 2023-02-17 DIAGNOSIS — Z98.890 OTHER SPECIFIED POSTPROCEDURAL STATES: Chronic | ICD-10-CM

## 2023-02-17 LAB
ADD ON TEST-SPECIMEN IN LAB: SIGNIFICANT CHANGE UP
ALBUMIN SERPL ELPH-MCNC: 3 G/DL — LOW (ref 3.3–5)
ALP SERPL-CCNC: 69 U/L — SIGNIFICANT CHANGE UP (ref 40–120)
ALT FLD-CCNC: 14 U/L — SIGNIFICANT CHANGE UP (ref 12–78)
ANION GAP SERPL CALC-SCNC: 4 MMOL/L — LOW (ref 5–17)
APTT BLD: 29.7 SEC — SIGNIFICANT CHANGE UP (ref 27.5–35.5)
AST SERPL-CCNC: 18 U/L — SIGNIFICANT CHANGE UP (ref 15–37)
BASOPHILS # BLD AUTO: 0 K/UL — SIGNIFICANT CHANGE UP (ref 0–0.2)
BASOPHILS NFR BLD AUTO: 0 % — SIGNIFICANT CHANGE UP (ref 0–2)
BILIRUB SERPL-MCNC: 1 MG/DL — SIGNIFICANT CHANGE UP (ref 0.2–1.2)
BUN SERPL-MCNC: 17 MG/DL — SIGNIFICANT CHANGE UP (ref 7–23)
CALCIUM SERPL-MCNC: 9.1 MG/DL — SIGNIFICANT CHANGE UP (ref 8.5–10.1)
CHLORIDE SERPL-SCNC: 105 MMOL/L — SIGNIFICANT CHANGE UP (ref 96–108)
CO2 SERPL-SCNC: 27 MMOL/L — SIGNIFICANT CHANGE UP (ref 22–31)
CREAT SERPL-MCNC: 0.9 MG/DL — SIGNIFICANT CHANGE UP (ref 0.5–1.3)
EGFR: 61 ML/MIN/1.73M2 — SIGNIFICANT CHANGE UP
EOSINOPHIL # BLD AUTO: 0 K/UL — SIGNIFICANT CHANGE UP (ref 0–0.5)
EOSINOPHIL NFR BLD AUTO: 0 % — SIGNIFICANT CHANGE UP (ref 0–6)
FLUAV AG NPH QL: SIGNIFICANT CHANGE UP
FLUBV AG NPH QL: SIGNIFICANT CHANGE UP
GLUCOSE SERPL-MCNC: 110 MG/DL — HIGH (ref 70–99)
HCT VFR BLD CALC: 45.8 % — HIGH (ref 34.5–45)
HGB BLD-MCNC: 15 G/DL — SIGNIFICANT CHANGE UP (ref 11.5–15.5)
INR BLD: 1.14 RATIO — SIGNIFICANT CHANGE UP (ref 0.88–1.16)
LACTATE SERPL-SCNC: 1.6 MMOL/L — SIGNIFICANT CHANGE UP (ref 0.7–2)
LYMPHOCYTES # BLD AUTO: 1.29 K/UL — SIGNIFICANT CHANGE UP (ref 1–3.3)
LYMPHOCYTES # BLD AUTO: 9 % — LOW (ref 13–44)
MCHC RBC-ENTMCNC: 32.5 PG — SIGNIFICANT CHANGE UP (ref 27–34)
MCHC RBC-ENTMCNC: 32.8 GM/DL — SIGNIFICANT CHANGE UP (ref 32–36)
MCV RBC AUTO: 99.1 FL — SIGNIFICANT CHANGE UP (ref 80–100)
MONOCYTES # BLD AUTO: 1.58 K/UL — HIGH (ref 0–0.9)
MONOCYTES NFR BLD AUTO: 11 % — SIGNIFICANT CHANGE UP (ref 2–14)
NEUTROPHILS # BLD AUTO: 11.47 K/UL — HIGH (ref 1.8–7.4)
NEUTROPHILS NFR BLD AUTO: 80 % — HIGH (ref 43–77)
NRBC # BLD: SIGNIFICANT CHANGE UP /100 WBCS (ref 0–0)
PLATELET # BLD AUTO: 307 K/UL — SIGNIFICANT CHANGE UP (ref 150–400)
POTASSIUM SERPL-MCNC: 4.2 MMOL/L — SIGNIFICANT CHANGE UP (ref 3.5–5.3)
POTASSIUM SERPL-SCNC: 4.2 MMOL/L — SIGNIFICANT CHANGE UP (ref 3.5–5.3)
PROT SERPL-MCNC: 7.8 GM/DL — SIGNIFICANT CHANGE UP (ref 6–8.3)
PROTHROM AB SERPL-ACNC: 13.2 SEC — SIGNIFICANT CHANGE UP (ref 10.5–13.4)
RBC # BLD: 4.62 M/UL — SIGNIFICANT CHANGE UP (ref 3.8–5.2)
RBC # FLD: 13 % — SIGNIFICANT CHANGE UP (ref 10.3–14.5)
RSV RNA NPH QL NAA+NON-PROBE: SIGNIFICANT CHANGE UP
SARS-COV-2 RNA SPEC QL NAA+PROBE: SIGNIFICANT CHANGE UP
SODIUM SERPL-SCNC: 136 MMOL/L — SIGNIFICANT CHANGE UP (ref 135–145)
WBC # BLD: 14.34 K/UL — HIGH (ref 3.8–10.5)
WBC # FLD AUTO: 14.34 K/UL — HIGH (ref 3.8–10.5)

## 2023-02-17 PROCEDURE — 99222 1ST HOSP IP/OBS MODERATE 55: CPT | Mod: GC

## 2023-02-17 PROCEDURE — 99285 EMERGENCY DEPT VISIT HI MDM: CPT | Mod: FS

## 2023-02-17 PROCEDURE — 97116 GAIT TRAINING THERAPY: CPT | Mod: GP

## 2023-02-17 PROCEDURE — 93010 ELECTROCARDIOGRAM REPORT: CPT

## 2023-02-17 PROCEDURE — 97163 PT EVAL HIGH COMPLEX 45 MIN: CPT | Mod: GP

## 2023-02-17 PROCEDURE — 80048 BASIC METABOLIC PNL TOTAL CA: CPT

## 2023-02-17 PROCEDURE — 84100 ASSAY OF PHOSPHORUS: CPT

## 2023-02-17 PROCEDURE — 85027 COMPLETE CBC AUTOMATED: CPT

## 2023-02-17 PROCEDURE — 36415 COLL VENOUS BLD VENIPUNCTURE: CPT

## 2023-02-17 PROCEDURE — 80053 COMPREHEN METABOLIC PANEL: CPT

## 2023-02-17 PROCEDURE — 93971 EXTREMITY STUDY: CPT | Mod: 26,RT

## 2023-02-17 PROCEDURE — 83735 ASSAY OF MAGNESIUM: CPT

## 2023-02-17 PROCEDURE — 83605 ASSAY OF LACTIC ACID: CPT

## 2023-02-17 PROCEDURE — 73562 X-RAY EXAM OF KNEE 3: CPT | Mod: 26,RT

## 2023-02-17 RX ORDER — IBUPROFEN 200 MG
400 TABLET ORAL EVERY 6 HOURS
Refills: 0 | Status: DISCONTINUED | OUTPATIENT
Start: 2023-02-17 | End: 2023-02-20

## 2023-02-17 RX ORDER — CEPHALEXIN 500 MG
500 CAPSULE ORAL ONCE
Refills: 0 | Status: COMPLETED | OUTPATIENT
Start: 2023-02-17 | End: 2023-02-17

## 2023-02-17 RX ORDER — ONDANSETRON 8 MG/1
1 TABLET, FILM COATED ORAL
Qty: 12 | Refills: 0
Start: 2023-02-17 | End: 2023-02-20

## 2023-02-17 RX ORDER — LANOLIN ALCOHOL/MO/W.PET/CERES
3 CREAM (GRAM) TOPICAL AT BEDTIME
Refills: 0 | Status: DISCONTINUED | OUTPATIENT
Start: 2023-02-17 | End: 2023-02-20

## 2023-02-17 RX ORDER — CEFTRIAXONE 500 MG/1
1000 INJECTION, POWDER, FOR SOLUTION INTRAMUSCULAR; INTRAVENOUS ONCE
Refills: 0 | Status: COMPLETED | OUTPATIENT
Start: 2023-02-17 | End: 2023-02-17

## 2023-02-17 RX ORDER — ACETAMINOPHEN 500 MG
650 TABLET ORAL EVERY 6 HOURS
Refills: 0 | Status: DISCONTINUED | OUTPATIENT
Start: 2023-02-17 | End: 2023-02-20

## 2023-02-17 RX ORDER — SODIUM CHLORIDE 9 MG/ML
1000 INJECTION INTRAMUSCULAR; INTRAVENOUS; SUBCUTANEOUS
Refills: 0 | Status: DISCONTINUED | OUTPATIENT
Start: 2023-02-17 | End: 2023-02-20

## 2023-02-17 RX ORDER — ONDANSETRON 8 MG/1
4 TABLET, FILM COATED ORAL EVERY 8 HOURS
Refills: 0 | Status: DISCONTINUED | OUTPATIENT
Start: 2023-02-17 | End: 2023-02-20

## 2023-02-17 RX ORDER — CEFTRIAXONE 500 MG/1
1000 INJECTION, POWDER, FOR SOLUTION INTRAMUSCULAR; INTRAVENOUS ONCE
Refills: 0 | Status: DISCONTINUED | OUTPATIENT
Start: 2023-02-17 | End: 2023-02-17

## 2023-02-17 RX ADMIN — Medication 110 MILLIGRAM(S): at 23:16

## 2023-02-17 RX ADMIN — CEFTRIAXONE 1000 MILLIGRAM(S): 500 INJECTION, POWDER, FOR SOLUTION INTRAMUSCULAR; INTRAVENOUS at 14:14

## 2023-02-17 NOTE — H&P ADULT - ASSESSMENT
89 yo F w PMH of B/L LE varicose veins s/p ligation, Lyme disease 5 years ago presents to  ED due to RLE swelling, pain and erythema. Pt states that last week she notices her RLE was hurting, 3 days afterwards started becoming red and swollen.  Being admitted for further management of popliteal cyst and cellulitis.     #Popliteal Cyst  -Pt presenting w RLE pain, erythema and edema, sent in from Ortho to rule out DVT   -RLE US negative for DVT but demonstrated large popliteal cyst   -Ortho referral for possible intervention   -Patient in mild pain, Tylenol and Ibuprofen as needed     #Cellulitis   -Overlying skin erythema, edema and warmth may be 2/2 cellulitis   -Labs notable for leukocytosis, patient afebrile, lactate WNL, does not meet sepsis criteria   -S/P Cephalexin and CTX in ED   -Pt states she previously had Adverse reactions to penicillin antibiotics   -Hesitant to start Vancomycin, will start on Doxycycline 100 mg PO BID x 7 days   -Switch to IV abx if patient becomes febrile or meets sepsis criteria     #Diet   -Regular     #DVT PPX   -SCD’s, caution in RLE     #Advanced Directives   -Discussed intubation and resuscitation, has not had the conversation w her family, would like to think about further, has not appointed a HCP, has 8 adult children. Encouraged to initiate conversation w her family. FULL CODE.      *Above discussed w Dr. Campbell  89 yo F w PMH of B/L LE varicose veins s/p ligation, Lyme disease 5 years ago presents to  ED due to RLE swelling, pain and erythema. Pt states that last week she notices her RLE was hurting, 3 days afterwards started becoming red and swollen.  Being admitted for further management of popliteal cyst and cellulitis.     #Popliteal Cyst  -Pt presenting w RLE pain, erythema and edema, sent in from Ortho to rule out DVT   -RLE US negative for DVT but demonstrated large popliteal cyst   -Ortho referral for possible intervention   -Patient in mild pain, Tylenol and Ibuprofen as needed   -Recommend MRI for further visualization, differential includes cyst rupture vs rule out malignancy    #Cellulitis   -Overlying skin erythema, edema and warmth may be 2/2 cellulitis   -Labs notable for leukocytosis, patient afebrile, lactate WNL, does not meet sepsis criteria   -S/P Cephalexin and CTX in ED   -Pt states she previously had Adverse reactions to penicillin antibiotics   -Hesitant to start Vancomycin, will start on Doxycycline 100 mg PO BID x 7 days   -Switch to IV abx if patient becomes febrile or meets sepsis criteria     #Diet   -Regular     #DVT PPX   -SCD’s, caution in RLE     #Advanced Directives   -Discussed intubation and resuscitation, has not had the conversation w her family, would like to think about further, has not appointed a HCP, has 8 adult children. Encouraged to initiate conversation w her family. FULL CODE.      *Above discussed w Dr. Campbell

## 2023-02-17 NOTE — ED STATDOCS - MUSCULOSKELETAL, MLM
Right lower leg diffusely swollen. TTP to calf and anterior tibia. Skin erythematous. Swelling extends to ankle and top of foot.

## 2023-02-17 NOTE — ED STATDOCS - ATTENDING APP SHARED VISIT CONTRIBUTION OF CARE
I, Tahir Watters, DO personally saw the patient with MIN.  I have personally performed a face to face diagnostic evaluation on this patient.  I have reviewed the MIN note and agree with the history, exam, and plan of care, except as noted.  I personally saw the patient and performed a substantive portion of the visit including all aspects of the medical decision making.

## 2023-02-17 NOTE — H&P ADULT - ATTENDING COMMENTS
Pt is a pleasant  89 yo Female w PMH of B/L LE varicose veins s/p ligation, Lyme disease (2018) who was seen by outpatient Orthopedics and  is admitted with RLE Cellulitis, Leukocytosis and large complex  popliteal cyst.     - cont Ceftriaxone and Doxycycline  - pain control  - MRI to evaluate for possible cyst rupture, mass  - DVT proph: SCDS  - Full Code

## 2023-02-17 NOTE — ED STATDOCS - CLINICAL SUMMARY MEDICAL DECISION MAKING FREE TEXT BOX
Elderly female presents with lower leg pain and swelling. Exam concerning for DVT and cellulitis. No CP or SOB to suggest PE. No tachycardia or hypoxia. EKG NSR. No ischemic changes. Plan: labs, abx, US, reassess.

## 2023-02-17 NOTE — ED ADULT TRIAGE NOTE - CHIEF COMPLAINT QUOTE
patient presenting via wheelchair to ED, sent in by orthopedics' office, c/o right lower extremity pain, redness and swelling x3 days. denies chest pain, SOB. not on blood thinners.

## 2023-02-17 NOTE — ED STATDOCS - NSFOLLOWUPINSTRUCTIONS_ED_ALL_ED_FT
Gastroenteritis viral en los adultos    Viral Gastroenteritis, Adult       La gastroenteritis viral también se conoce charity gripe estomacal. Esta afección podría afectar el estómago, el intestino beckwith y el intestino grueso. Puede causar diarrea líquida, fiebre y vómitos repentinos. Esta afección es causada por muchos virus diferentes. Estos virus pueden transmitirse de fab persona a otra con mucha facilidad (son contagiosos).    La diarrea y los vómitos pueden hacerlo sentir débil y causar deshidratación. Es posible que no pueda retener los líquidos. La deshidratación puede causarle cansancio, sed, sequedad en la boca y disminución en la frecuencia con la que orina. Es importante restituir los líquidos que pierde por causa de la diarrea y los vómitos.      ¿Cuáles son las causas?    La gastroenteritis es causada por muchos virus, entre los que se incluyen el rotavirus y el norovirus. El norovirus es la causa más frecuente en los adultos. Puede enfermarse después de estar expuesto a los virus de otras personas. También puede enfermarse de las siguientes maneras:  •A través de la ingesta de alimentos o agua contaminados, o por tocar superficies contaminadas con alguno de estos virus.      •Al compartir utensilios u otros artículos personales con fab persona infectada.        ¿Qué incrementa el riesgo?    Es más probable que tenga esta afección si:  •Tiene debilitado el sistema de defensa del organismo (sistema inmunitario).      •Viven con estefani o más niños menores de 2 años.      •Vive en un hogar de ancianos.      •Viaja en un crucero.        ¿Cuáles son los signos o los síntomas?    Los síntomas de esta afección suelen aparecer entre 1 y 3 días después de la exposición al virus. Pueden durar algunos días o incluso fab semana. Los síntomas frecuentes son diarrea líquida y vómitos. Otros síntomas pueden incluir los siguientes:  •Fiebre.      •Dolor de christiane.      •Fatiga.      •Dolor en el abdomen.      •Escalofríos.      •Debilidad.      •Náuseas.      •Ashlee musculares.      •Pérdida del apetito.        ¿Cómo se diagnostica?    Esta afección se diagnostica mediante fab revisión de los antecedentes médicos y un examen físico. También podrían hacerle un análisis de materia fecal para detectar virus u otras infecciones.      ¿Cómo se trata?    Por lo general, esta afección desaparece por sí john. El tratamiento se centra en prevenir la deshidratación y reponer los líquidos perdidos (rehidratación). El tratamiento de esta afección puede incluir:  •Fab solución de rehidratación oral (SRO) para reemplazar sales y minerales (electrolitos) importantes en el cuerpo. Tómela si se lo indicó el médico. Esta es fab bebida que se vende en farmacias y tiendas minoristas.      •Medicamentos para aliviar los síntomas.      •Suplementos probióticos para disminuir los síntomas de diarrea.      •Administración de líquidos por vía intravenosa si la deshidratación es grave.      Los adultos mayores y las personas que tienen otras enfermedades o un sistema inmunitario débil están en mayor riesgo de deshidratación.      Siga estas instrucciones en haines casa:       Comida y bebida      •Callum fab SRO charity se lo haya indicado el médico.    •En la medida en que pueda, ebonie líquidos sole en pequeñas cantidades. Los líquidos transparentes son, por ejemplo:  •Agua.      •Trocitos de hielo.      •Jugo de frutas diluido.      •Bebidas deportivas de bajas calorías.        •Ebonie suficiente líquido charity para mantener la orina de color amarillo pálido.      •Coma pequeñas cantidades de alimentos saludables cada 3 a 4 horas según haines tolerancia. Estos pueden incluir cereales integrales, frutas, verduras, kit magras y yogur.      •Evite consumir líquidos que contengan mucha azúcar o cafeína, charity bebidas energéticas, bebidas deportivas y refrescos.      •Evite los alimentos condimentados o con alto contenido de grasa.      •Evite callum alcohol.      Instrucciones generales     •Lávese las radha con frecuencia, especialmente después de tener diarrea o vómitos. Use desinfectante para radha si no dispone de agua y jabón.      •Asegúrese de que todas las personas que viven en haines casa se laven harvey las radha y con frecuencia.      •Sebring los medicamentos de venta ishaan y los recetados solamente charity se lo haya indicado el médico.      •Descanse en haines casa mientras se recupera.      •Controle haines afección para detectar cualquier cambio.      •Sebring un baño caliente para ayudar a disminuir el ardor o el dolor causados por los episodios frecuentes de diarrea.      •Concurra a todas las visitas de seguimiento charity se lo haya indicado el médico. Morton es importante.        Comuníquese con un médico si:    •No retiene los líquidos.      •Tiene síntomas que empeoran.      •Tiene nuevos síntomas.      •Se siente mareado o siente que va a desvanecerse.      •Presenta calambres musculares.        Solicite ayuda inmediatamente si:    •Siente dolor en el pecho.      •Se siente muy débil o se desmaya.      •Observa john en el vómito.      •Tiene vómito que se asemeja al poso del café.      •Tiene heces con john, de color ru, o con aspecto alquitranado.      •Siente dolor de christiane intenso, rigidez en el maurice, o ambas cosas.      •Tiene fab erupción cutánea.      •Tiene dolor intenso, cólicos o distensión en el abdomen.      •Tiene problemas para respirar o respira muy rápidamente.      •Tiene latidos cardíacos acelerados.      •Tiene la piel fría y húmeda.      •Se siente confundido.      •Tiene dolor al orinar.    •Tiene signos de deshidratación, charity los siguientes:  •Orina de color oscuro, muy escasa o falta de orina.      •Labios agrietados.      •Sequedad de boca.      •Ojos hundidos.      •Somnolencia.      •Debilidad.          Resumen    •La gastroenteritis viral también se conoce charity gripe estomacal. Puede causar diarrea líquida, fiebre y vómitos repentinos.      •Esta afección se puede transmitir de fab persona a otra con mucha facilidad (es contagiosa).      •Sebring fab SRO si se lo indicó el médico. Esta es fab bebida que se vende en farmacias y tiendas minoristas.      •Lávese las radha con frecuencia, especialmente después de tener diarrea o vómitos. Use desinfectante para radha si no dispone de agua y jabón.      Esta información no tiene charity fin reemplazar el consejo del médico. Asegúrese de hacerle al médico cualquier pregunta que tenga.

## 2023-02-17 NOTE — PATIENT PROFILE ADULT - FALL HARM RISK - HARM RISK INTERVENTIONS

## 2023-02-17 NOTE — H&P ADULT - NSHPSOCIALHISTORY_GEN_ALL_CORE
Lives by herself, although her adult children live nearby and check in on her often. Ambulates independently, does not need a device. Admits to an occasional glass of wine w dinner. Admits to smoking half a pack a day in her early 20’s.

## 2023-02-17 NOTE — H&P ADULT - HISTORY OF PRESENT ILLNESS
89 yo F w PMH of B/L MANDY varicose veins s/p ligation, Lyme disease 5 years ago presents to  ED due to RLE swelling, pain and erythema. Pt states that last week she notices her RLE was hurting, 3 days afterwards started becoming red and swollen. One of her adult children visited her today who recommended she go to an Ortho doctor for evaluation. At that visit, they recommended for her to come in to rule out DVT. Patient denies recent travel, immobilization or surgeries. Patient at baseline is able to ambulate independently and does not need an assistive device.  Denies chest pain, SOB, palpitations or hx of coagulopathies.      In ED: /101 upon presentation. Given CTX ad cephalexin in ED. Labs notable for WBC 14. RLE US - Large complex right popliteal cyst. NO DVT. R knee Xray - Mild right knee effusion. Mild degeneration.     Past Admission: 2018 for left 5th metacarpal bone fracture

## 2023-02-17 NOTE — ED STATDOCS - PROGRESS NOTE DETAILS
Patient seen and evaluated, ED attending note and orders reviewed, will continue with patient follow up and care -Giselle Marquez PA-C pt feeling much better after meds, will dc home with zofran, pmd f/u return precautions   Nasrin translated   Giselle Marquez PA-C labs with leukocytosis, US with No evidence of right lower extremity deep venous thrombosis.  Large 10cm complex right popliteal cyst, will consult ortho and admit pt for iv abx  Giselle Marquez PA-C

## 2023-02-17 NOTE — H&P ADULT - NSHPPHYSICALEXAM_GEN_ALL_CORE
Vitals  T(F): 99.3 (02-17-23 @ 18:55), Max: 100 (02-17-23 @ 17:16)  HR: 99 (02-17-23 @ 18:55) (68 - 99)  BP: 140/66 (02-17-23 @ 18:55) (140/66 - 179/101)  RR: 18 (02-17-23 @ 18:55) (18 - 18)  SpO2: 96% (02-17-23 @ 18:55) (96% - 100%)    Physical Exam   Gen: NAD, comfortable  HENT: atraumatic head and ears, no gross abnormalities of ears, mucous membranes moist, no oral lesions, neck supple without masses/goiter/lymphadenopathy  CV: RRR, nl s1/s2, no M/R/G  Pulm: nl respiratory effort, CTAB, no wheezes/crackles/rhonchi  Back: no scoliosis, lordosis, or kyphosis, no tenderness  GI: normoactive bowel sounds in all 4 quadrants, soft, nontender, nondistended, no rebound, no guarding, no masses  Extremities: RLE edematous, erythematous, warm, pedal pulses palpable   Skin: nl warm and dry, no wounds   Neuro: A&Ox3, answering questions appropriately, PERRL, EOMI, face symmetric, sensation equal bilaterally in face, tongue midline, no dysarthria, 5/5 strength in upper and lower extremities bilaterally, sensation intact in upper and lower extremities bilaterally, nl finger to nose, and nl heel to shin   Pysch: no depression, no SI, no HI

## 2023-02-17 NOTE — PHARMACOTHERAPY INTERVENTION NOTE - COMMENTS
Medication reconciliation completed.  Reviewed Medication list and confirmed med allergies with patient; confirmed with Dr. Madison MedHx.  Pt confirms that she does not take any medication at home, only vitamins.  Pt states that she has "many allergies" and daughter at bedside elaborated that pt is "sensitive to any medication new medication, so she doesn't really take anything."
Clear bilaterally

## 2023-02-17 NOTE — ED STATDOCS - PATIENT PORTAL LINK FT
You can access the FollowMyHealth Patient Portal offered by  by registering at the following website: [email or web address]. By joining [facility] [employer]’s FollowMyHealth portal, you will also be able to view your health information using other applications (apps) compatible with our system.

## 2023-02-17 NOTE — ED ADULT NURSE NOTE - NSIMPLEMENTINTERV_GEN_ALL_ED
Implemented All Fall with Harm Risk Interventions:  [place] to call system. Call bell, personal items and telephone within reach. Instruct patient to call for assistance. Room bathroom lighting operational. Non-slip footwear when patient is off stretcher. Physically safe environment: no spills, clutter or unnecessary equipment. Stretcher in lowest position, wheels locked, appropriate side rails in place. Provide visual cue, wrist band, yellow gown, etc. Monitor gait and stability. Monitor for mental status changes and reorient to person, place, and time. Review medications for side effects contributing to fall risk. Reinforce activity limits and safety measures with patient and family. Provide visual clues: red socks.

## 2023-02-17 NOTE — ED STATDOCS - CARE PLAN
1 Principal Discharge DX:	Viral gastroenteritis   Principal Discharge DX:	Cellulitis of right lower leg  Secondary Diagnosis:	Popliteal cyst, right

## 2023-02-17 NOTE — ED STATDOCS - NS ED ATTENDING STATEMENT MOD
This was a shared visit with the MIN. I reviewed and verified the documentation and independently performed the documented:

## 2023-02-17 NOTE — ED ADULT NURSE NOTE - OBJECTIVE STATEMENT
presents to ED from orthopedics office with RLE pain, swelling and erythema x3 days. + warmth upon palpation. denies CP, SOB. not on AC. denies pmhx.

## 2023-02-17 NOTE — ED STATDOCS - OBJECTIVE STATEMENT
89 y/o female with a PMHx of varicose veins presents to the ED c/o right leg pain, redness and swelling x3 days. Denies CP, SOB. Not on anticoagulation. No hx of DVT or PE. No recent surgeries. No recent travel. No other complaints at this time.

## 2023-02-18 LAB
ALBUMIN SERPL ELPH-MCNC: 2.4 G/DL — LOW (ref 3.3–5)
ALP SERPL-CCNC: 60 U/L — SIGNIFICANT CHANGE UP (ref 40–120)
ALT FLD-CCNC: 10 U/L — LOW (ref 12–78)
ANION GAP SERPL CALC-SCNC: 6 MMOL/L — SIGNIFICANT CHANGE UP (ref 5–17)
AST SERPL-CCNC: 16 U/L — SIGNIFICANT CHANGE UP (ref 15–37)
BILIRUB SERPL-MCNC: 0.9 MG/DL — SIGNIFICANT CHANGE UP (ref 0.2–1.2)
BUN SERPL-MCNC: 14 MG/DL — SIGNIFICANT CHANGE UP (ref 7–23)
CALCIUM SERPL-MCNC: 8.4 MG/DL — LOW (ref 8.5–10.1)
CHLORIDE SERPL-SCNC: 106 MMOL/L — SIGNIFICANT CHANGE UP (ref 96–108)
CO2 SERPL-SCNC: 25 MMOL/L — SIGNIFICANT CHANGE UP (ref 22–31)
CREAT SERPL-MCNC: 0.8 MG/DL — SIGNIFICANT CHANGE UP (ref 0.5–1.3)
EGFR: 70 ML/MIN/1.73M2 — SIGNIFICANT CHANGE UP
GLUCOSE SERPL-MCNC: 118 MG/DL — HIGH (ref 70–99)
HCT VFR BLD CALC: 43.1 % — SIGNIFICANT CHANGE UP (ref 34.5–45)
HGB BLD-MCNC: 14.2 G/DL — SIGNIFICANT CHANGE UP (ref 11.5–15.5)
MAGNESIUM SERPL-MCNC: 2.2 MG/DL — SIGNIFICANT CHANGE UP (ref 1.6–2.6)
MCHC RBC-ENTMCNC: 32.3 PG — SIGNIFICANT CHANGE UP (ref 27–34)
MCHC RBC-ENTMCNC: 32.9 GM/DL — SIGNIFICANT CHANGE UP (ref 32–36)
MCV RBC AUTO: 98 FL — SIGNIFICANT CHANGE UP (ref 80–100)
PHOSPHATE SERPL-MCNC: 3 MG/DL — SIGNIFICANT CHANGE UP (ref 2.5–4.5)
PLATELET # BLD AUTO: 288 K/UL — SIGNIFICANT CHANGE UP (ref 150–400)
POTASSIUM SERPL-MCNC: 3.9 MMOL/L — SIGNIFICANT CHANGE UP (ref 3.5–5.3)
POTASSIUM SERPL-SCNC: 3.9 MMOL/L — SIGNIFICANT CHANGE UP (ref 3.5–5.3)
PROT SERPL-MCNC: 6.6 GM/DL — SIGNIFICANT CHANGE UP (ref 6–8.3)
RBC # BLD: 4.4 M/UL — SIGNIFICANT CHANGE UP (ref 3.8–5.2)
RBC # FLD: 12.9 % — SIGNIFICANT CHANGE UP (ref 10.3–14.5)
SODIUM SERPL-SCNC: 137 MMOL/L — SIGNIFICANT CHANGE UP (ref 135–145)
WBC # BLD: 10.93 K/UL — HIGH (ref 3.8–10.5)
WBC # FLD AUTO: 10.93 K/UL — HIGH (ref 3.8–10.5)

## 2023-02-18 PROCEDURE — 99233 SBSQ HOSP IP/OBS HIGH 50: CPT | Mod: GC

## 2023-02-18 RX ORDER — LACTOBACILLUS ACIDOPHILUS 100MM CELL
1 CAPSULE ORAL DAILY
Refills: 0 | Status: DISCONTINUED | OUTPATIENT
Start: 2023-02-18 | End: 2023-02-20

## 2023-02-18 RX ORDER — ENOXAPARIN SODIUM 100 MG/ML
40 INJECTION SUBCUTANEOUS EVERY 24 HOURS
Refills: 0 | Status: DISCONTINUED | OUTPATIENT
Start: 2023-02-18 | End: 2023-02-20

## 2023-02-18 RX ADMIN — Medication 400 MILLIGRAM(S): at 10:00

## 2023-02-18 RX ADMIN — ENOXAPARIN SODIUM 40 MILLIGRAM(S): 100 INJECTION SUBCUTANEOUS at 17:53

## 2023-02-18 RX ADMIN — Medication 1 TABLET(S): at 10:00

## 2023-02-18 RX ADMIN — Medication 110 MILLIGRAM(S): at 17:53

## 2023-02-18 RX ADMIN — SODIUM CHLORIDE 100 MILLILITER(S): 9 INJECTION INTRAMUSCULAR; INTRAVENOUS; SUBCUTANEOUS at 00:15

## 2023-02-18 RX ADMIN — Medication 110 MILLIGRAM(S): at 06:40

## 2023-02-18 RX ADMIN — Medication 400 MILLIGRAM(S): at 10:30

## 2023-02-18 NOTE — PROGRESS NOTE ADULT - NSPROGADDITIONALINFOA_GEN_ALL_CORE
Patient seen and examined at the bedside. Please see resident note for full details regarding the service.     General: Awake and alert, cooperative with exam. No acute distress.   Cardiology: Normal S1, S2. No murmurs. RRR.   Respiratory: Lungs CTABL. No wheezes, rales, or rhonchi.   Gastrointestinal: + BS. Soft. NT. ND. No guarding, rigidity, or rebound tenderness.  Extremities: No peripheral edema bilaterally. 2+ dorsalis pedis pulses. Right lower extremity swollen and erythematous, warm to touch.   Neurological: A+Ox2. CN 2-12 intact. No FND. Normal speech. No facial droop.   Psychiatric: Normal affect. Normal mood.     Assessment:   - Leukocytosis secondary to RLE cellulitis   - Complex popliteal cyst   - History of B/L LE varicose veins s/p ligation, Lyme disease 5 years ago     Plan:   - c/w Doxycycline   - MRI right knee pending   - Orthopedics consult pending   - WBC count improving   - PT evaluation   - Full code

## 2023-02-18 NOTE — CONSULT NOTE ADULT - SUBJECTIVE AND OBJECTIVE BOX
89y Female presents with . Denies numbness/tingling in the affected extremity. Denies head strike/LOC/other orthopedic injuries at this time. Patient is RIGHT // LEFT hand dominant. Patient ambulates without assistance // with cane // with walker at baseline.    PAST MEDICAL & SURGICAL HISTORY:  H/o Lyme disease      H/O varicose vein ligation        Home Medications:  Multiple Vitamins oral tablet: 1 tab(s) orally once a day (17 Feb 2023 16:28)  Omega-3 1000 mg oral capsule: 1 cap(s) orally once a day (17 Feb 2023 16:28)    Allergies    Bactrim (Unknown)  penicillin (Unknown)    Intolerances                              14.2   10.93 )-----------( 288      ( 18 Feb 2023 06:58 )             43.1     02-18    137  |  106  |  14  ----------------------------<  118<H>  3.9   |  25  |  0.80    Ca    8.4<L>      18 Feb 2023 06:58  Phos  3.0     02-18  Mg     2.2     02-18    TPro  6.6  /  Alb  2.4<L>  /  TBili  0.9  /  DBili  x   /  AST  16  /  ALT  10<L>  /  AlkPhos  60  02-18    PT/INR - ( 17 Feb 2023 13:04 )   PT: 13.2 sec;   INR: 1.14 ratio         PTT - ( 17 Feb 2023 13:04 )  PTT:29.7 sec        Vital Signs Last 24 Hrs  T(C): 37 (18 Feb 2023 07:58), Max: 37.8 (17 Feb 2023 17:16)  T(F): 98.6 (18 Feb 2023 07:58), Max: 100 (17 Feb 2023 17:16)  HR: 82 (18 Feb 2023 07:58) (68 - 99)  BP: 146/73 (18 Feb 2023 07:58) (140/66 - 179/101)  BP(mean): 106 (17 Feb 2023 15:51) (106 - 114)  RR: 18 (18 Feb 2023 07:58) (18 - 18)  SpO2: 97% (18 Feb 2023 07:58) (94% - 100%)    Parameters below as of 18 Feb 2023 07:58  Patient On (Oxygen Delivery Method): room air        PHYSICAL EXAM  General: NAD, Awake and Alert  RLE  skin intact  diffuse swelling and pitting edema up to level of knee  erythema and warmth overlying anterior mid tibia  mild knee effusion  SILT L2-S1  +EHL/FHL/TA/GSC  no pain with micromotion of knee  full knee AROM with mild pain  compartments soft and compressible  calf TTP  foot wwp, cap refill <2 sec        IMAGING:  XR R knee: mild OA    Assessment/Plan:  89y Female with     -Pain control as needed  -DVT ppx  -WBAT // PWB // NWB  -Will discuss with attending, and advise if plan changes   89y Female community ambulator without assistance presented for evaluation of RLE cellulitis. Patient has had 1 week of difficulty ambulating due to right calf pain. Denies numbness/tingling in the affected extremity. Denies trauma, falls, or other orthopedic injuries at this time. Denies recent fevers or chills.    PAST MEDICAL & SURGICAL HISTORY:  H/o Lyme disease      H/O varicose vein ligation        Home Medications:  Multiple Vitamins oral tablet: 1 tab(s) orally once a day (17 Feb 2023 16:28)  Omega-3 1000 mg oral capsule: 1 cap(s) orally once a day (17 Feb 2023 16:28)    Allergies    Bactrim (Unknown)  penicillin (Unknown)    Intolerances                              14.2   10.93 )-----------( 288      ( 18 Feb 2023 06:58 )             43.1     02-18    137  |  106  |  14  ----------------------------<  118<H>  3.9   |  25  |  0.80    Ca    8.4<L>      18 Feb 2023 06:58  Phos  3.0     02-18  Mg     2.2     02-18    TPro  6.6  /  Alb  2.4<L>  /  TBili  0.9  /  DBili  x   /  AST  16  /  ALT  10<L>  /  AlkPhos  60  02-18    PT/INR - ( 17 Feb 2023 13:04 )   PT: 13.2 sec;   INR: 1.14 ratio         PTT - ( 17 Feb 2023 13:04 )  PTT:29.7 sec        Vital Signs Last 24 Hrs  T(C): 37 (18 Feb 2023 07:58), Max: 37.8 (17 Feb 2023 17:16)  T(F): 98.6 (18 Feb 2023 07:58), Max: 100 (17 Feb 2023 17:16)  HR: 82 (18 Feb 2023 07:58) (68 - 99)  BP: 146/73 (18 Feb 2023 07:58) (140/66 - 179/101)  BP(mean): 106 (17 Feb 2023 15:51) (106 - 114)  RR: 18 (18 Feb 2023 07:58) (18 - 18)  SpO2: 97% (18 Feb 2023 07:58) (94% - 100%)    Parameters below as of 18 Feb 2023 07:58  Patient On (Oxygen Delivery Method): room air        PHYSICAL EXAM  General: NAD, Awake and Alert  RLE  skin intact  diffuse swelling and 2+ pitting edema up to level of knee  erythema and warmth overlying anterior mid tibia  mild knee effusion  SILT L2-S1  +EHL/FHL/TA/GSC  no pain with micromotion of knee  full knee AROM with mild pain  compartments soft and compressible  calf TTP  foot wwp, cap refill <2 sec        IMAGING:  XR R knee: mild OA    Assessment/Plan:  89y Female with R Lopez's cyst    -imaging and case reviewed with Dr. Villatoro  -no concern for septic arthritis based on patient's history and exam  -no acute orthopedic surgical intervention  -ortho stable for DC planning  -pt may follow up with Dr. Villatoro in office upon discharge from hospital, call office for appointment  -cellulitis treatment per primary team  -Pain control as needed  -DVT ppx per primary  -WBAT RLE  -management of cellulitis per primary team  -please reconsult as needed  -discussed with Dr. Villatoro who agrees with plan

## 2023-02-18 NOTE — PROGRESS NOTE ADULT - SUBJECTIVE AND OBJECTIVE BOX
Patient seen and examined at the bedside. Please see resident note for full details regarding the service.     General: Awake and alert, cooperative with exam. No acute distress.   Cardiology: Normal S1, S2. No murmurs. RRR.   Respiratory: Lungs CTABL. No wheezes, rales, or rhonchi.   Gastrointestinal: + BS. Soft. NT. ND. No guarding, rigidity, or rebound tenderness.  Extremities: No peripheral edema bilaterally. 2+ dorsalis pedis pulses. Right lower extremity swollen and erythematous, warm to touch.   Neurological: A+Ox2. CN 2-12 intact. No FND. Normal speech. No facial droop.   Psychiatric: Normal affect. Normal mood.     Assessment:   - Leukocytosis secondary to RLE cellulitis   - Complex popliteal cyst   - History of B/L LE varicose veins s/p ligation, Lyme disease 5 years ago     Plan:   - c/w Doxycycline   - MRI right knee pending   - Orthopedics consult pending   - WBC count improving   - PT evaluation   - Full code    HPI   89 yo F w PMH of B/L LE varicose veins s/p ligation, Lyme disease 5 years ago presents to  ED due to RLE swelling, pain and erythema. Pt states that last week she notices her RLE was hurting, 3 days afterwards started becoming red and swollen. One of her adult children visited her today who recommended she go to an Ortho doctor for evaluation. At that visit, they recommended for her to come in to rule out DVT. Patient denies recent travel, immobilization or surgeries. Patient at baseline is able to ambulate independently and does not need an assistive device.  Denies chest pain, SOB, palpitations or hx of coagulopathies.      In ED: /101 upon presentation. Given CTX ad cephalexin in ED. Labs notable for WBC 14. RLE US - Large complex right popliteal cyst. NO DVT. R knee Xray - Mild right knee effusion. Mild degeneration.     Past Admission: 2018 for left 5th metacarpal bone fracture     Subjective: Pt seen and evaluated by bedside. Pt c/o of soreness around the IV insertion site on L arm. No erythema, edema noted. No other complaints.     REVIEW OF SYSTEMS:  All other ROS negative, except as noted above.     =======================================================  Vitals:  T(F): 98.6 (18 Feb 2023 07:58), Max: 100 (17 Feb 2023 17:16)  HR: 82 (18 Feb 2023 07:58)  BP: 146/73 (18 Feb 2023 07:58)  RR: 18 (18 Feb 2023 07:58)  SpO2: 97% (18 Feb 2023 07:58) (94% - 100%)  temp max in last 48H T(F): , Max: 100 (02-17-23 @ 17:16)      =======================================================    PHYSICAL EXAM:  Constitutional: Pt lying in bed, awake and alert, NAD  HEENT: EOMI, normal hearing, moist mucous membranes  Neck: Soft and supple, no JVD  Respiratory: CTABL, No wheezing, rales or rhonchi  Cardiovascular: S1S2+, RRR, no M/G/R  Gastrointestinal: BS+, soft, NT/ND, no guarding, no rebound  Extremities: RLE with diffuse edema up to knee; erythema and warmth over R anterior tibia; R knee effusion, R calf tenderness  Vascular: 2+ peripheral pulses  Neurological: AAOx3, no focal deficits  Musculoskeletal: moving upper and lower extremities, SILT   Skin: No rashes    =======================================================  Current Antibiotics:  doxycycline IVPB 100 milliGRAM(s) IV Intermittent every 12 hours  doxycycline IVPB        Other medications:  ibuprofen  Tablet. 400 milliGRAM(s) Oral every 6 hours  multivitamin 1 Tablet(s) Oral daily  sodium chloride 0.9%. 1000 milliLiter(s) IV Continuous <Continuous>      =======================================================  Labs:                        14.2   10.93 )-----------( 288      ( 18 Feb 2023 06:58 )             43.1     02-18    137  |  106  |  14  ----------------------------<  118<H>  3.9   |  25  |  0.80    Ca    8.4<L>      18 Feb 2023 06:58  Phos  3.0     02-18  Mg     2.2     02-18    TPro  6.6  /  Alb  2.4<L>  /  TBili  0.9  /  DBili  x   /  AST  16  /  ALT  10<L>  /  AlkPhos  60  02-18        SARS-CoV-2 Result: Elke (02-17-23 @ 14:48)        ========================================================  Imaging:

## 2023-02-18 NOTE — PROGRESS NOTE ADULT - ASSESSMENT
89 yo F w PMH of B/L LE varicose veins s/p ligation, Lyme disease 5 years ago presents to  ED due to RLE swelling, pain and erythema. Pt states that last week she notices her RLE was hurting, 3 days afterwards started becoming red and swollen.  Being admitted for further management of popliteal cyst and cellulitis.     #Popliteal Cyst  -Pt presenting w RLE pain, erythema and edema, sent in from Ortho to rule out DVT   -RLE US negative for DVT but demonstrated large popliteal cyst   -Patient in mild pain, Tylenol and Ibuprofen as needed   -Recommend MRI for further visualization, differential includes cyst rupture vs rule out malignancy  -ortho rec appreciated:      - no acute ortho surgical intervention indicated      -     #Cellulitis   -Overlying skin erythema, edema and warmth may be 2/2 cellulitis   -Labs notable for leukocytosis, patient afebrile, lactate WNL, does not meet sepsis criteria   -S/P Cephalexin and CTX in ED   -Pt states she previously had Adverse reactions to penicillin antibiotics   -Hesitant to start Vancomycin, will start on Doxycycline 100 mg PO BID x 7 days   -Switch to IV abx if patient becomes febrile or meets sepsis criteria     #Diet   -Regular     #DVT PPX   -SCD’s, caution in RLE     #Advanced Directives   -Discussed intubation and resuscitation, has not had the conversation w her family, would like to think about further, has not appointed a HCP, has 8 adult children. Encouraged to initiate conversation w her family. FULL CODE.      *Above discussed w Dr. Campbell  89 yo F w PMH of B/L LE varicose veins s/p ligation, Lyme disease 5 years ago presents to  ED due to RLE swelling, pain and erythema. Pt states that last week she notices her RLE was hurting, 3 days afterwards started becoming red and swollen.  Being admitted for further management of popliteal cyst and cellulitis.     #Popliteal Cyst  -Pt presenting w RLE pain, erythema and edema, sent in from Ortho to rule out DVT   -RLE US negative for DVT but demonstrated large popliteal cyst   -Patient in mild pain, Tylenol and Ibuprofen as needed   -Recommend MRI for further visualization, differential includes cyst rupture vs rule out malignancy  -ortho rec appreciated:      - no acute ortho surgical intervention indicated      - f/u with Dr. Villatoro outpatient      - WBAT     #Cellulitis   -Overlying skin erythema, edema and warmth may be 2/2 cellulitis   -Labs notable for leukocytosis, patient afebrile, lactate WNL, does not meet sepsis criteria   -S/P Cephalexin and CTX in ED   -Pt states she previously had Adverse reactions to penicillin antibiotics   -Hesitant to start Vancomycin, will start on Doxycycline 100 mg PO BID x 7 days   -Switch to IV abx if patient becomes febrile or meets sepsis criteria     #Diet   -Regular     #DVT PPX   -SCD’s, caution in RLE     #Advanced Directives   -Discussed intubation and resuscitation, has not had the conversation w her family, would like to think about further, has not appointed a HCP, has 8 adult children. Encouraged to initiate conversation w her family.   -FULL CODE.      *Above discussed w Dr. Kent  89 yo F w PMH of B/L LE varicose veins s/p ligation, Lyme disease 5 years ago presents to  ED due to RLE swelling, pain and erythema. Pt states that last week she notices her RLE was hurting, 3 days afterwards started becoming red and swollen.  Being admitted for further management of popliteal cyst and cellulitis.     #Popliteal Cyst  -Pt presenting w RLE pain, erythema and edema, sent in from Ortho to rule out DVT   -RLE US negative for DVT but demonstrated large popliteal cyst   -Patient in mild pain, Tylenol and Ibuprofen as needed   -Recommend MRI for further visualization, differential includes cyst rupture vs rule out malignancy  -ortho rec appreciated:      - no acute ortho surgical intervention indicated      - f/u with Dr. Villatoro outpatient      - WBAT     #Cellulitis   -Overlying skin erythema, edema and warmth may be 2/2 cellulitis   -Labs notable for leukocytosis, patient afebrile, lactate WNL, does not meet sepsis criteria   -S/P Cephalexin and CTX in ED   -Pt states she previously had Adverse reactions to penicillin antibiotics   -Hesitant to start Vancomycin, will start on Doxycycline 100 mg PO BID x 7 days   -Switch to IV abx if patient becomes febrile or meets sepsis criteria     #Diet   -Regular     #DVT PPX   -SCD’s, caution in RLE     #Advanced Directives   -Discussed intubation and resuscitation, has not had the conversation w her family, would like to think about further, has not appointed a HCP, has 8 adult children. Encouraged to initiate conversation w her family.   -FULL CODE.      Dispo: likely d/c tomorrow if prelim blood culture neg    *Above discussed w Dr. Kent

## 2023-02-19 VITALS
TEMPERATURE: 97 F | HEART RATE: 91 BPM | DIASTOLIC BLOOD PRESSURE: 60 MMHG | OXYGEN SATURATION: 96 % | SYSTOLIC BLOOD PRESSURE: 133 MMHG | RESPIRATION RATE: 18 BRPM

## 2023-02-19 LAB
ANION GAP SERPL CALC-SCNC: 4 MMOL/L — LOW (ref 5–17)
BUN SERPL-MCNC: 16 MG/DL — SIGNIFICANT CHANGE UP (ref 7–23)
CALCIUM SERPL-MCNC: 8.9 MG/DL — SIGNIFICANT CHANGE UP (ref 8.5–10.1)
CHLORIDE SERPL-SCNC: 106 MMOL/L — SIGNIFICANT CHANGE UP (ref 96–108)
CO2 SERPL-SCNC: 26 MMOL/L — SIGNIFICANT CHANGE UP (ref 22–31)
CREAT SERPL-MCNC: 0.86 MG/DL — SIGNIFICANT CHANGE UP (ref 0.5–1.3)
EGFR: 64 ML/MIN/1.73M2 — SIGNIFICANT CHANGE UP
GLUCOSE SERPL-MCNC: 157 MG/DL — HIGH (ref 70–99)
HCT VFR BLD CALC: 44.1 % — SIGNIFICANT CHANGE UP (ref 34.5–45)
HGB BLD-MCNC: 14.3 G/DL — SIGNIFICANT CHANGE UP (ref 11.5–15.5)
MCHC RBC-ENTMCNC: 32.1 PG — SIGNIFICANT CHANGE UP (ref 27–34)
MCHC RBC-ENTMCNC: 32.4 GM/DL — SIGNIFICANT CHANGE UP (ref 32–36)
MCV RBC AUTO: 98.9 FL — SIGNIFICANT CHANGE UP (ref 80–100)
PLATELET # BLD AUTO: 348 K/UL — SIGNIFICANT CHANGE UP (ref 150–400)
POTASSIUM SERPL-MCNC: 4.1 MMOL/L — SIGNIFICANT CHANGE UP (ref 3.5–5.3)
POTASSIUM SERPL-SCNC: 4.1 MMOL/L — SIGNIFICANT CHANGE UP (ref 3.5–5.3)
RBC # BLD: 4.46 M/UL — SIGNIFICANT CHANGE UP (ref 3.8–5.2)
RBC # FLD: 12.8 % — SIGNIFICANT CHANGE UP (ref 10.3–14.5)
SODIUM SERPL-SCNC: 136 MMOL/L — SIGNIFICANT CHANGE UP (ref 135–145)
WBC # BLD: 9.82 K/UL — SIGNIFICANT CHANGE UP (ref 3.8–10.5)
WBC # FLD AUTO: 9.82 K/UL — SIGNIFICANT CHANGE UP (ref 3.8–10.5)

## 2023-02-19 PROCEDURE — 99233 SBSQ HOSP IP/OBS HIGH 50: CPT

## 2023-02-19 RX ADMIN — ENOXAPARIN SODIUM 40 MILLIGRAM(S): 100 INJECTION SUBCUTANEOUS at 17:45

## 2023-02-19 RX ADMIN — Medication 110 MILLIGRAM(S): at 17:45

## 2023-02-19 RX ADMIN — Medication 1 TABLET(S): at 09:44

## 2023-02-19 RX ADMIN — Medication 1 TABLET(S): at 09:43

## 2023-02-19 RX ADMIN — Medication 110 MILLIGRAM(S): at 05:36

## 2023-02-19 RX ADMIN — Medication 650 MILLIGRAM(S): at 15:14

## 2023-02-19 RX ADMIN — Medication 650 MILLIGRAM(S): at 15:40

## 2023-02-19 NOTE — PROGRESS NOTE ADULT - SUBJECTIVE AND OBJECTIVE BOX
HPI   89 yo F w PMH of B/L MANDY varicose veins s/p ligation, Lyme disease 5 years ago presents to  ED due to RLE swelling, pain and erythema. Pt states that last week she notices her RLE was hurting, 3 days afterwards started becoming red and swollen. One of her adult children visited her today who recommended she go to an Ortho doctor for evaluation. At that visit, they recommended for her to come in to rule out DVT. Patient denies recent travel, immobilization or surgeries. Patient at baseline is able to ambulate independently and does not need an assistive device.  Denies chest pain, SOB, palpitations or hx of coagulopathies.      In ED: /101 upon presentation. Given CTX ad cephalexin in ED. Labs notable for WBC 14. RLE US - Large complex right popliteal cyst. NO DVT. R knee Xray - Mild right knee effusion. Mild degeneration.     Past Admission: 2018 for left 5th metacarpal bone fracture     Subjective: Pt seen and evaluated by bedside. Pt states pain has improved in RLE. No other complaints.     REVIEW OF SYSTEMS:  All other ROS negative, except as noted above.     =======================================================  Vital Signs Last 24 Hrs  T(C): 36.8 (19 Feb 2023 07:59), Max: 37 (18 Feb 2023 23:14)  T(F): 98.2 (19 Feb 2023 07:59), Max: 98.6 (18 Feb 2023 23:14)  HR: 95 (19 Feb 2023 07:59) (88 - 96)  BP: 150/83 (19 Feb 2023 07:59) (116/80 - 160/80)  BP(mean): --  RR: 18 (19 Feb 2023 07:59) (18 - 18)  SpO2: 97% (19 Feb 2023 07:59) (96% - 97%)    Parameters below as of 19 Feb 2023 07:59  Patient On (Oxygen Delivery Method): room air        =======================================================    PHYSICAL EXAM:  Constitutional: Pt lying in bed, awake and alert, NAD  HEENT: EOMI, normal hearing, moist mucous membranes  Neck: Soft and supple, no JVD  Respiratory: CTABL, No wheezing, rales or rhonchi  Cardiovascular: S1S2+, RRR, no M/G/R  Gastrointestinal: BS+, soft, NT/ND, no guarding, no rebound  Extremities: RLE with diffuse edema up to knee; erythema and warmth over R anterior tibia; R knee effusion, R calf tenderness  Vascular: 2+ peripheral pulses  Neurological: AAOx3, no focal deficits  Musculoskeletal: moving upper and lower extremities, SILT   Skin: No rashes    =======================================================  Current Antibiotics:  doxycycline IVPB 100 milliGRAM(s) IV Intermittent every 12 hours  doxycycline IVPB        Other medications:  ibuprofen  Tablet. 400 milliGRAM(s) Oral every 6 hours  multivitamin 1 Tablet(s) Oral daily  sodium chloride 0.9%. 1000 milliLiter(s) IV Continuous <Continuous>      =======================================================  Labs:                                   14.3   9.82  )-----------( 348      ( 19 Feb 2023 08:33 )             44.1   02-19    136  |  106  |  16  ----------------------------<  157<H>  4.1   |  26  |  0.86    Ca    8.9      19 Feb 2023 08:33  Phos  3.0     02-18  Mg     2.2     02-18    TPro  6.6  /  Alb  2.4<L>  /  TBili  0.9  /  DBili  x   /  AST  16  /  ALT  10<L>  /  AlkPhos  60  02-18        SARS-CoV-2 Result: NotDetec (02-17-23 @ 14:48)        ========================================================  Imaging:

## 2023-02-19 NOTE — PROGRESS NOTE ADULT - ASSESSMENT
89 yo F w PMH of B/L LE varicose veins s/p ligation, Lyme disease 5 years ago presents to  ED due to RLE swelling, pain and erythema. Pt states that last week she notices her RLE was hurting, 3 days afterwards started becoming red and swollen.  Being admitted for further management of popliteal cyst and cellulitis.     #Popliteal Cyst  -Pt presenting w RLE pain, erythema and edema, sent in from Ortho to rule out DVT   -RLE US negative for DVT but demonstrated large popliteal cyst   -Patient in mild pain, Tylenol and Ibuprofen as needed   -Recommend MRI for further visualization, differential includes cyst rupture vs rule out malignancy.  -ortho rec appreciated:      - no acute ortho surgical intervention indicated      - f/u with Dr. Villatoro outpatient      - WBAT   -F/u PT recs    #Cellulitis   -Overlying skin erythema, edema and warmth may be 2/2 cellulitis   -Labs notable for leukocytosis, patient afebrile, lactate WNL, does not meet sepsis criteria   -S/P Cephalexin and CTX in ED   -Pt states she previously had Adverse reactions to penicillin antibiotics   -continue Doxycycline 100 mg IV BID    #Diet   -Regular     #DVT PPX   -SCD’s, caution in RLE     #Advanced Directives   -Discussed intubation and resuscitation, has not had the conversation w her family, would like to think about further, has not appointed a HCP, has 8 adult children. Encouraged to initiate conversation w her family.   -FULL CODE.      Dispo: likely d/c tomorrow     *Above discussed w Dr. Kumar

## 2023-02-20 ENCOUNTER — TRANSCRIPTION ENCOUNTER (OUTPATIENT)
Age: 88
End: 2023-02-20

## 2023-02-20 PROCEDURE — 99239 HOSP IP/OBS DSCHRG MGMT >30: CPT

## 2023-02-20 RX ORDER — ACETAMINOPHEN 500 MG
2 TABLET ORAL
Qty: 40 | Refills: 0
Start: 2023-02-20 | End: 2023-02-24

## 2023-02-20 RX ORDER — LACTOBACILLUS ACIDOPHILUS 100MM CELL
1 CAPSULE ORAL
Qty: 7 | Refills: 0
Start: 2023-02-20 | End: 2023-02-26

## 2023-02-20 RX ADMIN — Medication 1 TABLET(S): at 10:30

## 2023-02-20 RX ADMIN — Medication 110 MILLIGRAM(S): at 05:03

## 2023-02-20 NOTE — DISCHARGE NOTE PROVIDER - HOSPITAL COURSE
89 yo F w PMH of B/L LE varicose veins s/p ligation, Lyme disease 5 years ago presents to  ED on 2/17/23 due to RLE swelling, pain and erythema. Patient denied recent travel, immobilization or surgeries. Patient at baseline is able to ambulate independently and does not need an assistive device.  Denies chest pain, SOB, palpitations or hx of coagulopathies.  In ED: /101 upon presentation. Given CTX ad cephalexin in ED. Labs notable for WBC 14. Pt did not meet sepsis criteria. RLE US - Large complex right popliteal cyst. NO DVT. R knee Xray - Mild right knee effusion. Mild degeneration. On physical exam, pt with erythema over R anterior tibia. Pt was admitted for further management of popliteal cyst and cellulitis of RLE. Orthopedics was consulted and no acute surgical intervention was indicated. Pt to be followed up with Dr. Villatoro outpatient. Physical therapy recommended home assistance PRN and ambulate with walker for balance/gait stability. Pt was placed on doxycyline as pt previously had adverse reaction to penicillin abx. WBC trended down to wnl. On the day of discharge, pt medically cleared to be discharged to home.     Subjective: Pt seen and evaluated by bedside. NAD. No new complaints. Shorty f/c, HA, SOB, diarrhea, CP.     Vitals:  T(C): 36.2 (19 Feb 2023 23:02), Max: 38.3 (19 Feb 2023 15:12)  T(F): 97.2 (19 Feb 2023 23:02), Max: 100.9 (19 Feb 2023 15:12)  HR: 91 (19 Feb 2023 23:02) (91 - 98)  BP: 133/60 (19 Feb 2023 23:02) (133/60 - 136/64)  RR: 18 (19 Feb 2023 23:02) (17 - 18)  SpO2: 96% (19 Feb 2023 23:02) (95% - 96%)  O2 Parameters below as of 19 Feb 2023 23:02  Patient On (Oxygen Delivery Method): room air        PE:   Constitutional: Pt lying in bed, awake and alert, NAD  HEENT: EOMI, normal hearing, moist mucous membranes  Neck: Soft and supple, no JVD  Respiratory: CTABL, No wheezing, rales or rhonchi  Cardiovascular: S1S2+, RRR, no M/G/R  Gastrointestinal: BS+, soft, NT/ND, no guarding, no rebound  Extremities: RLE with diffuse edema up to knee; erythema and warmth over R anterior tibia; R knee effusion  Vascular: 2+ peripheral pulses  Neurological: AAOx3, no focal deficits  Musculoskeletal: moving upper and lower extremities, SILT   Skin: No rashes        Imaging:  Xray Knee 3 Views, Right (02.17.23 @ 15:15)     IMPRESSION: Mild right knee effusion. Mild degeneration.        US Duplex Venous Lower Ext Ltd, Right (02.17.23 @ 13:54)     IMPRESSION:  No evidence of right lower extremity deep venous thrombosis.  Large complex right popliteal cyst.

## 2023-02-20 NOTE — DISCHARGE NOTE PROVIDER - NSDCMRMEDTOKEN_GEN_ALL_CORE_FT
acetaminophen 325 mg oral tablet: 2 tab(s) orally every 6 hours, As needed, Temp greater or equal to 38C (100.4F), Mild Pain (1 - 3)  doxycycline monohydrate 100 mg oral capsule: 1 cap(s) orally 2 times a day unitl finished   lactobacillus acidophilus oral capsule: 1 tab(s) orally once a day   Multiple Vitamins oral tablet: 1 tab(s) orally once a day  Omega-3 1000 mg oral capsule: 1 cap(s) orally once a day   acetaminophen 325 mg oral tablet: 2 tab(s) orally every 6 hours, As needed, Temp greater or equal to 38C (100.4F), Mild Pain (1 - 3)  doxycycline 50 mg/5 mL oral syrup: 10 milliliter(s) orally 2 times a day for  4 and 1/2 days, total of 90 mL.   lactobacillus acidophilus oral capsule: 1 tab(s) orally once a day   Multiple Vitamins oral tablet: 1 tab(s) orally once a day  Omega-3 1000 mg oral capsule: 1 cap(s) orally once a day

## 2023-02-20 NOTE — DISCHARGE NOTE PROVIDER - NSDCCPCAREPLAN_GEN_ALL_CORE_FT
PRINCIPAL DISCHARGE DIAGNOSIS  Diagnosis: Cellulitis of right lower leg  Assessment and Plan of Treatment: You have been diagnosed with cellulitis. This is an infection in the deepest layer of the skin and tissue beneath the skin. In some cases, the infection also affects the muscle. Cellulitis is caused by bacteria. The bacteria can enter the body through broken skin. This can happen with a cut, scratch, animal bite, or an insect bite that has been scratched. You were treated in the hospital with antibiotics and fluids.  :  Take the prescribed antibiotic medicine you are given as directed until it is gone. Take it even if you feel better. It treats the infection and stops it from  Not taking all the medicine can make future infections hard to treat. Keep the infected area clean. When possible, raise the infected area above the level of your heart. This helps keep swelling down. Talk with your healthcare provider if you are in pain. Ask what kind of over-the-counter medicine you can take for pain. Apply clean bandages as advised.  :  Wash your hands often to prevent spreading the infection. In the future, wash your hands before and after you touch cuts, scratches, or bandages. This will help prevent infection.   :  Call your doctor immediately if you have any of the following:   - Difficulty or pain when moving the joints above or below the infected area  - Discharge or pus draining from the area,   - Fever of 100.4°F (38°C) or higher  - Pain that gets worse in or around the infected site,   - Worsening redness in or around the infected area, especially if the area of redness expands to a wider area  - Shaking chills  - Swelling of the infected area  - Vomiting  TAKE DOXYCYCLINE 100MG TWICE A DAY UNITL IT IS GONE.   PLEASE FOLLOW UP WITH YOUR PCP WITHIN ONE WEEK OF DISCHARGE FOR FURTHER MANAGEMENT.         SECONDARY DISCHARGE DIAGNOSES  Diagnosis: Popliteal cyst, right  Assessment and Plan of Treatment: Please follow up with Dr. Villatoro upon discharge for further management. Take tylenol for pain. Also, NSAIDs can be taken for pain and as anti-inflammatory agent, however you have refused to take them during this hospitalization. Weigth bearing activities are allowed as tolerated. Use rollling walker to ambulate for balance and gait stability.

## 2023-02-20 NOTE — PHYSICAL THERAPY INITIAL EVALUATION ADULT - LIVES WITH, PROFILE
Pt lives at home alone but has 9 children and 18 grandchildren frequently check in on her. Patient drives to grocery store, doctor's appointments and Rastafarian Pt lives at home alone but has 9 children and 18 grandchildren frequently check in on her. Patient drives to grocery store, doctor's appointments and Evangelical. Patient has 2 ANTOINETTE and 13 steps to second floor bedroom

## 2023-02-20 NOTE — DISCHARGE NOTE NURSING/CASE MANAGEMENT/SOCIAL WORK - PATIENT PORTAL LINK FT
You can access the FollowMyHealth Patient Portal offered by [facility] by registering at the following website: [email or web address]. By joining [facility]’s FollowMyHealth portal, you will also be able to view your health information using other applications (apps) compatible with our system.

## 2023-02-20 NOTE — DISCHARGE NOTE PROVIDER - PROVIDER TOKENS
PROVIDER:[TOKEN:[[id number]],FOLLOWUP:[1 week],ESTABLISHEDPATIENT:[T]],PROVIDER:[TOKEN:[[id number]],FOLLOWUP:[1-3 days]]

## 2023-02-20 NOTE — DISCHARGE NOTE PROVIDER - CARE PROVIDER_API CALL
Noelle Diamond  FAMILY MEDICINE  [address] [place] [address]  Phone: [phone]  Fax: [phone]  Established Patient  Follow Up Time: 1 week    Eliud Villatoro ()  Orthopaedic Surgery  [address] [place] [address]  Phone: [phone]  Fax: [phone]  Follow Up Time: 1-3 days

## 2023-02-20 NOTE — DISCHARGE NOTE NURSING/CASE MANAGEMENT/SOCIAL WORK - NSDCVIVACCINE_GEN_ALL_CORE_FT
Tdap; 09-Oct-2018 13:37; Marychuy Huffman (RN); [employer]; [id number] (Exp. Date: 13-Jun-2020); IntraMuscular; Deltoid Right.; 0.5 milliLiter(s); VIS (VIS Published: 09-May-2013, VIS Presented: 09-Oct-2018);

## 2023-02-20 NOTE — DISCHARGE NOTE NURSING/CASE MANAGEMENT/SOCIAL WORK - NSDCPEFALRISK_GEN_ALL_CORE
For information on Fall & Injury Prevention, visit: [email or web address] OR  [email or web address] OR  https://www.cdc.gov/steadi/patient.html

## 2023-02-20 NOTE — PHYSICAL THERAPY INITIAL EVALUATION ADULT - MODALITIES TREATMENT COMMENTS
Pt left OOB in chair in NAD with CBIR. Pt instructed to not get up alone. Chair alarm activated. LILLIE Berg made aware

## 2023-02-23 DIAGNOSIS — M71.21 SYNOVIAL CYST OF POPLITEAL SPACE [BAKER], RIGHT KNEE: ICD-10-CM

## 2023-02-23 DIAGNOSIS — L03.115 CELLULITIS OF RIGHT LOWER LIMB: ICD-10-CM

## 2023-02-23 DIAGNOSIS — M25.461 EFFUSION, RIGHT KNEE: ICD-10-CM

## 2023-02-23 DIAGNOSIS — Z88.8 ALLERGY STATUS TO OTHER DRUGS, MEDICAMENTS AND BIOLOGICAL SUBSTANCES STATUS: ICD-10-CM

## 2023-03-08 PROBLEM — Z86.19 PERSONAL HISTORY OF OTHER INFECTIOUS AND PARASITIC DISEASES: Chronic | Status: ACTIVE | Noted: 2023-02-17

## 2023-03-08 PROBLEM — Z86.79 PERSONAL HISTORY OF OTHER DISEASES OF THE CIRCULATORY SYSTEM: Chronic | Status: ACTIVE | Noted: 2023-02-23

## 2023-04-12 ENCOUNTER — APPOINTMENT (OUTPATIENT)
Dept: INTERNAL MEDICINE | Facility: CLINIC | Age: 88
End: 2023-04-12
Payer: MEDICARE

## 2023-04-12 VITALS
HEART RATE: 82 BPM | SYSTOLIC BLOOD PRESSURE: 136 MMHG | TEMPERATURE: 97.9 F | WEIGHT: 131 LBS | BODY MASS INDEX: 22.36 KG/M2 | HEIGHT: 64 IN | OXYGEN SATURATION: 96 % | DIASTOLIC BLOOD PRESSURE: 74 MMHG

## 2023-04-12 DIAGNOSIS — L03.119 CELLULITIS OF UNSPECIFIED PART OF LIMB: ICD-10-CM

## 2023-04-12 DIAGNOSIS — Z82.49 FAMILY HISTORY OF ISCHEMIC HEART DISEASE AND OTHER DISEASES OF THE CIRCULATORY SYSTEM: ICD-10-CM

## 2023-04-12 DIAGNOSIS — Z00.00 ENCOUNTER FOR GENERAL ADULT MEDICAL EXAMINATION W/OUT ABNORMAL FINDINGS: ICD-10-CM

## 2023-04-12 PROCEDURE — G0438: CPT

## 2023-04-12 PROCEDURE — 36415 COLL VENOUS BLD VENIPUNCTURE: CPT

## 2023-04-12 RX ORDER — FOLIC ACID 1 MG/1
1 TABLET ORAL
Qty: 60 | Refills: 0 | Status: COMPLETED | COMMUNITY
Start: 2019-07-17 | End: 2023-04-12

## 2023-04-12 NOTE — PHYSICAL EXAM
[No Acute Distress] : no acute distress [Well Nourished] : well nourished [Normal Sclera/Conjunctiva] : normal sclera/conjunctiva [Well Developed] : well developed [PERRL] : pupils equal round and reactive to light [EOMI] : extraocular movements intact [Normal TMs] : both tympanic membranes were normal [No Lymphadenopathy] : no lymphadenopathy [Supple] : supple [No Respiratory Distress] : no respiratory distress  [No Accessory Muscle Use] : no accessory muscle use [Clear to Auscultation] : lungs were clear to auscultation bilaterally [Normal Rate] : normal rate  [Regular Rhythm] : with a regular rhythm [Normal S1, S2] : normal S1 and S2 [Pedal Pulses Present] : the pedal pulses are present [No Extremity Clubbing/Cyanosis] : no extremity clubbing/cyanosis [Soft] : abdomen soft [Non Tender] : non-tender [Non-distended] : non-distended [Normal Bowel Sounds] : normal bowel sounds [No Rash] : no rash [Coordination Grossly Intact] : coordination grossly intact [No Focal Deficits] : no focal deficits [Normal Gait] : normal gait [Normal Affect] : the affect was normal [Alert and Oriented x3] : oriented to person, place, and time [Normal Insight/Judgement] : insight and judgment were intact

## 2023-04-13 ENCOUNTER — NON-APPOINTMENT (OUTPATIENT)
Age: 88
End: 2023-04-13

## 2023-04-13 DIAGNOSIS — R79.89 OTHER SPECIFIED ABNORMAL FINDINGS OF BLOOD CHEMISTRY: ICD-10-CM

## 2023-04-13 LAB
25(OH)D3 SERPL-MCNC: 31.3 NG/ML
ALBUMIN SERPL ELPH-MCNC: 4.2 G/DL
ALP BLD-CCNC: 87 U/L
ALT SERPL-CCNC: 12 U/L
ANION GAP SERPL CALC-SCNC: 14 MMOL/L
AST SERPL-CCNC: 21 U/L
BILIRUB SERPL-MCNC: 0.3 MG/DL
BUN SERPL-MCNC: 16 MG/DL
CALCIUM SERPL-MCNC: 10.1 MG/DL
CHLORIDE SERPL-SCNC: 104 MMOL/L
CO2 SERPL-SCNC: 24 MMOL/L
CREAT SERPL-MCNC: 0.92 MG/DL
EGFR: 59 ML/MIN/1.73M2
GLUCOSE SERPL-MCNC: 104 MG/DL
HCT VFR BLD CALC: 45.5 %
HGB BLD-MCNC: 14.2 G/DL
MCHC RBC-ENTMCNC: 31.1 PG
MCHC RBC-ENTMCNC: 31.2 GM/DL
MCV RBC AUTO: 99.8 FL
PLATELET # BLD AUTO: 336 K/UL
POTASSIUM SERPL-SCNC: 4.8 MMOL/L
PROT SERPL-MCNC: 7.4 G/DL
RBC # BLD: 4.56 M/UL
RBC # FLD: 13.3 %
SODIUM SERPL-SCNC: 142 MMOL/L
T4 FREE SERPL-MCNC: 1.1 NG/DL
TSH SERPL-ACNC: 5.1 UIU/ML
WBC # FLD AUTO: 9.13 K/UL

## 2023-04-14 LAB
CHOLEST SERPL-MCNC: 180 MG/DL
HDLC SERPL-MCNC: 45 MG/DL
LDLC SERPL CALC-MCNC: 94 MG/DL
NONHDLC SERPL-MCNC: 135 MG/DL
TRIGL SERPL-MCNC: 201 MG/DL

## 2023-04-17 PROBLEM — Z00.00 ENCOUNTER FOR PREVENTIVE HEALTH EXAMINATION: Status: ACTIVE | Noted: 2017-03-28

## 2023-04-17 NOTE — HEALTH RISK ASSESSMENT
[Yes] : Yes [Monthly or less (1 pt)] : Monthly or less (1 point) [1 or 2 (0 pts)] : 1 or 2 (0 points) [Never (0 pts)] : Never (0 points) [No] : In the past 12 months have you used drugs other than those required for medical reasons? No [0] : 2) Feeling down, depressed, or hopeless: Not at all (0) [PHQ-2 Negative - No further assessment needed] : PHQ-2 Negative - No further assessment needed [Former] : Former [> 15 Years] : > 15 Years [HIV test declined] : HIV test declined [Hepatitis C test declined] : Hepatitis C test declined [Retired] : retired [] :  [# Of Children ___] : has [unfilled] children [Feels Safe at Home] : Feels safe at home [Fully functional (bathing, dressing, toileting, transferring, walking, feeding)] : Fully functional (bathing, dressing, toileting, transferring, walking, feeding) [Fully functional (using the telephone, shopping, preparing meals, housekeeping, doing laundry, using] : Fully functional and needs no help or supervision to perform IADLs (using the telephone, shopping, preparing meals, housekeeping, doing laundry, using transportation, managing medications and managing finances) [Audit-CScore] : 1 [[id number]] : 0

## 2023-04-17 NOTE — REVIEW OF SYSTEMS
[Negative] : Psychiatric [Fever] : no fever [Chills] : no chills [Fatigue] : no fatigue [de-identified] : see HPI

## 2023-04-17 NOTE — HISTORY OF PRESENT ILLNESS
[FreeTextEntry1] : Initial visit  [de-identified] : Pt is a 89 yr old female with a h/o of lyme disease/ babesiosis 5 years ago , right popliteal cyst( drained by Dr. De Los Santos) . \par \par  She is here to establish care with this office, F/up  [facility] admission on  2/20/23 for right leg cellulitis. \par She was admitted for 2 days  , treated with doxycycline , and discharged. She was told to f/up with ortho MD, Dr. Villatoro and PCP. \par Pt used to see Dr. Diamond but has not seen a PCP in a few years. ( she did not bring any records with her). \par \par She admitted to non compliance with health prevention measures.\par  She has never had a mammo , PAP or colonoscopy and refuses. \par She states had a" bad reaction to a flu shot , ' became delirious and hallucinated." She has never had a flu shot since and refuses all vaccines. She did get 2 COVID vaccine, no booster ( unaware of dates)  . \par \par She takes no medications, smoked 1/2 PPD x 3 years while in high school. She is  with 9 children  6 live nearby . \par \par \par   none

## 2023-04-17 NOTE — ASSESSMENT
[FreeTextEntry1] : 1. HFU- right lower extremity cellulitis/ s/p recent hospital admission \par \par Pt competed antibiotic course / cellulitis resolved \par s/s of cellulitis discussed with pt \par \par 2.  health prevention  \par advised MAMMO/ Bone density , pt declined \par Screening colonoscopy, pt declined\par BW obtained in office , script for fasting lipids provided , pt will obtain at later date when fasting \par Pt refused all preventative vaccines \par f/up with BW results once reviewed \par \par \par \par \par \par  \par \par

## 2023-12-07 NOTE — ASU DISCHARGE PLAN (ADULT/PEDIATRIC). - MEDICATION SUMMARY - MEDICATIONS TO TAKE
Mom notified   I will START or STAY ON the medications listed below when I get home from the hospital:    cefadroxil 500 mg/5 mL oral liquid  -- 5 milliliter(s) by mouth 2 times a day MDD:500 mg 2X per day  -- Expires___________________  Finish all this medication unless otherwise directed by prescriber.  Refrigerate and shake well.  Expires_______________________    -- Indication: For post-op

## 2024-02-08 ENCOUNTER — INPATIENT (INPATIENT)
Facility: HOSPITAL | Age: 89
LOS: 0 days | Discharge: HOME | DRG: 641 | End: 2024-02-09
Attending: HOSPITALIST | Admitting: STUDENT IN AN ORGANIZED HEALTH CARE EDUCATION/TRAINING PROGRAM
Payer: MEDICARE

## 2024-02-08 VITALS
HEART RATE: 87 BPM | TEMPERATURE: 98 F | SYSTOLIC BLOOD PRESSURE: 128 MMHG | DIASTOLIC BLOOD PRESSURE: 88 MMHG | OXYGEN SATURATION: 97 % | RESPIRATION RATE: 18 BRPM

## 2024-02-08 DIAGNOSIS — Z98.890 OTHER SPECIFIED POSTPROCEDURAL STATES: Chronic | ICD-10-CM

## 2024-02-08 DIAGNOSIS — R55 SYNCOPE AND COLLAPSE: ICD-10-CM

## 2024-02-08 LAB
ADD ON TEST-SPECIMEN IN LAB: SIGNIFICANT CHANGE UP
ALBUMIN SERPL ELPH-MCNC: 3.6 G/DL — SIGNIFICANT CHANGE UP (ref 3.3–5)
ALBUMIN SERPL ELPH-MCNC: 3.9 G/DL — SIGNIFICANT CHANGE UP (ref 3.3–5)
ALP SERPL-CCNC: 55 U/L — SIGNIFICANT CHANGE UP (ref 40–120)
ALP SERPL-CCNC: 65 U/L — SIGNIFICANT CHANGE UP (ref 40–120)
ALT FLD-CCNC: 24 U/L — SIGNIFICANT CHANGE UP (ref 12–78)
ALT FLD-CCNC: 24 U/L — SIGNIFICANT CHANGE UP (ref 12–78)
ANION GAP SERPL CALC-SCNC: 5 MMOL/L — SIGNIFICANT CHANGE UP (ref 5–17)
ANION GAP SERPL CALC-SCNC: 6 MMOL/L — SIGNIFICANT CHANGE UP (ref 5–17)
AST SERPL-CCNC: 25 U/L — SIGNIFICANT CHANGE UP (ref 15–37)
AST SERPL-CCNC: 29 U/L — SIGNIFICANT CHANGE UP (ref 15–37)
BASOPHILS # BLD AUTO: 0.13 K/UL — SIGNIFICANT CHANGE UP (ref 0–0.2)
BASOPHILS NFR BLD AUTO: 0.7 % — SIGNIFICANT CHANGE UP (ref 0–2)
BILIRUB SERPL-MCNC: 0.8 MG/DL — SIGNIFICANT CHANGE UP (ref 0.2–1.2)
BILIRUB SERPL-MCNC: 0.8 MG/DL — SIGNIFICANT CHANGE UP (ref 0.2–1.2)
BUN SERPL-MCNC: 55 MG/DL — HIGH (ref 7–23)
BUN SERPL-MCNC: 57 MG/DL — HIGH (ref 7–23)
CALCIUM SERPL-MCNC: 10.3 MG/DL — HIGH (ref 8.5–10.1)
CALCIUM SERPL-MCNC: 9.3 MG/DL — SIGNIFICANT CHANGE UP (ref 8.5–10.1)
CHLORIDE SERPL-SCNC: 98 MMOL/L — SIGNIFICANT CHANGE UP (ref 96–108)
CHLORIDE SERPL-SCNC: 98 MMOL/L — SIGNIFICANT CHANGE UP (ref 96–108)
CK SERPL-CCNC: 208 U/L — HIGH (ref 26–192)
CK SERPL-CCNC: 250 U/L — HIGH (ref 26–192)
CO2 SERPL-SCNC: 28 MMOL/L — SIGNIFICANT CHANGE UP (ref 22–31)
CO2 SERPL-SCNC: 31 MMOL/L — SIGNIFICANT CHANGE UP (ref 22–31)
CREAT SERPL-MCNC: 2.71 MG/DL — HIGH (ref 0.5–1.3)
CREAT SERPL-MCNC: 2.97 MG/DL — HIGH (ref 0.5–1.3)
EGFR: 14 ML/MIN/1.73M2 — LOW
EGFR: 16 ML/MIN/1.73M2 — LOW
EOSINOPHIL # BLD AUTO: 0.03 K/UL — SIGNIFICANT CHANGE UP (ref 0–0.5)
EOSINOPHIL NFR BLD AUTO: 0.2 % — SIGNIFICANT CHANGE UP (ref 0–6)
FLUAV AG NPH QL: SIGNIFICANT CHANGE UP
FLUBV AG NPH QL: SIGNIFICANT CHANGE UP
GLUCOSE SERPL-MCNC: 115 MG/DL — HIGH (ref 70–99)
GLUCOSE SERPL-MCNC: 139 MG/DL — HIGH (ref 70–99)
HCT VFR BLD CALC: 42.2 % — SIGNIFICANT CHANGE UP (ref 34.5–45)
HCT VFR BLD CALC: 46.7 % — HIGH (ref 34.5–45)
HGB BLD-MCNC: 14 G/DL — SIGNIFICANT CHANGE UP (ref 11.5–15.5)
HGB BLD-MCNC: 15.2 G/DL — SIGNIFICANT CHANGE UP (ref 11.5–15.5)
IMM GRANULOCYTES NFR BLD AUTO: 0.4 % — SIGNIFICANT CHANGE UP (ref 0–0.9)
LYMPHOCYTES # BLD AUTO: 10.5 % — LOW (ref 13–44)
LYMPHOCYTES # BLD AUTO: 2.02 K/UL — SIGNIFICANT CHANGE UP (ref 1–3.3)
MAGNESIUM SERPL-MCNC: 2.7 MG/DL — HIGH (ref 1.6–2.6)
MCHC RBC-ENTMCNC: 30.9 PG — SIGNIFICANT CHANGE UP (ref 27–34)
MCHC RBC-ENTMCNC: 31 PG — SIGNIFICANT CHANGE UP (ref 27–34)
MCHC RBC-ENTMCNC: 32.5 GM/DL — SIGNIFICANT CHANGE UP (ref 32–36)
MCHC RBC-ENTMCNC: 33.2 GM/DL — SIGNIFICANT CHANGE UP (ref 32–36)
MCV RBC AUTO: 93.6 FL — SIGNIFICANT CHANGE UP (ref 80–100)
MCV RBC AUTO: 94.9 FL — SIGNIFICANT CHANGE UP (ref 80–100)
MONOCYTES # BLD AUTO: 1.43 K/UL — HIGH (ref 0–0.9)
MONOCYTES NFR BLD AUTO: 7.4 % — SIGNIFICANT CHANGE UP (ref 2–14)
NEUTROPHILS # BLD AUTO: 15.53 K/UL — HIGH (ref 1.8–7.4)
NEUTROPHILS NFR BLD AUTO: 80.8 % — HIGH (ref 43–77)
PHOSPHATE SERPL-MCNC: 4.3 MG/DL — SIGNIFICANT CHANGE UP (ref 2.5–4.5)
PLATELET # BLD AUTO: 265 K/UL — SIGNIFICANT CHANGE UP (ref 150–400)
PLATELET # BLD AUTO: 303 K/UL — SIGNIFICANT CHANGE UP (ref 150–400)
POTASSIUM SERPL-MCNC: 3.8 MMOL/L — SIGNIFICANT CHANGE UP (ref 3.5–5.3)
POTASSIUM SERPL-MCNC: 4.9 MMOL/L — SIGNIFICANT CHANGE UP (ref 3.5–5.3)
POTASSIUM SERPL-SCNC: 3.8 MMOL/L — SIGNIFICANT CHANGE UP (ref 3.5–5.3)
POTASSIUM SERPL-SCNC: 4.9 MMOL/L — SIGNIFICANT CHANGE UP (ref 3.5–5.3)
PROT SERPL-MCNC: 7 GM/DL — SIGNIFICANT CHANGE UP (ref 6–8.3)
PROT SERPL-MCNC: 7.8 GM/DL — SIGNIFICANT CHANGE UP (ref 6–8.3)
RBC # BLD: 4.51 M/UL — SIGNIFICANT CHANGE UP (ref 3.8–5.2)
RBC # BLD: 4.92 M/UL — SIGNIFICANT CHANGE UP (ref 3.8–5.2)
RBC # FLD: 13.4 % — SIGNIFICANT CHANGE UP (ref 10.3–14.5)
RBC # FLD: 13.4 % — SIGNIFICANT CHANGE UP (ref 10.3–14.5)
RSV RNA NPH QL NAA+NON-PROBE: SIGNIFICANT CHANGE UP
SARS-COV-2 RNA SPEC QL NAA+PROBE: SIGNIFICANT CHANGE UP
SODIUM SERPL-SCNC: 132 MMOL/L — LOW (ref 135–145)
SODIUM SERPL-SCNC: 134 MMOL/L — LOW (ref 135–145)
TROPONIN I, HIGH SENSITIVITY RESULT: 58.03 NG/L — HIGH
TROPONIN I, HIGH SENSITIVITY RESULT: 63.12 NG/L — HIGH
WBC # BLD: 18.81 K/UL — HIGH (ref 3.8–10.5)
WBC # BLD: 19.22 K/UL — HIGH (ref 3.8–10.5)
WBC # FLD AUTO: 18.81 K/UL — HIGH (ref 3.8–10.5)
WBC # FLD AUTO: 19.22 K/UL — HIGH (ref 3.8–10.5)

## 2024-02-08 PROCEDURE — 72125 CT NECK SPINE W/O DYE: CPT | Mod: 26,MA

## 2024-02-08 PROCEDURE — 70486 CT MAXILLOFACIAL W/O DYE: CPT | Mod: 26,MA

## 2024-02-08 PROCEDURE — 83036 HEMOGLOBIN GLYCOSYLATED A1C: CPT

## 2024-02-08 PROCEDURE — 99285 EMERGENCY DEPT VISIT HI MDM: CPT

## 2024-02-08 PROCEDURE — 81001 URINALYSIS AUTO W/SCOPE: CPT

## 2024-02-08 PROCEDURE — 80061 LIPID PANEL: CPT

## 2024-02-08 PROCEDURE — 70450 CT HEAD/BRAIN W/O DYE: CPT | Mod: 26,MA

## 2024-02-08 PROCEDURE — 76376 3D RENDER W/INTRP POSTPROCES: CPT | Mod: 26

## 2024-02-08 PROCEDURE — 99223 1ST HOSP IP/OBS HIGH 75: CPT

## 2024-02-08 PROCEDURE — 87086 URINE CULTURE/COLONY COUNT: CPT

## 2024-02-08 PROCEDURE — 93010 ELECTROCARDIOGRAM REPORT: CPT

## 2024-02-08 PROCEDURE — 93880 EXTRACRANIAL BILAT STUDY: CPT

## 2024-02-08 PROCEDURE — 93306 TTE W/DOPPLER COMPLETE: CPT

## 2024-02-08 PROCEDURE — 71250 CT THORAX DX C-: CPT | Mod: 26,MA

## 2024-02-08 PROCEDURE — 82607 VITAMIN B-12: CPT

## 2024-02-08 PROCEDURE — 82550 ASSAY OF CK (CPK): CPT

## 2024-02-08 PROCEDURE — 97116 GAIT TRAINING THERAPY: CPT | Mod: GP

## 2024-02-08 PROCEDURE — 72170 X-RAY EXAM OF PELVIS: CPT | Mod: 26

## 2024-02-08 PROCEDURE — 97163 PT EVAL HIGH COMPLEX 45 MIN: CPT | Mod: GP

## 2024-02-08 PROCEDURE — 84443 ASSAY THYROID STIM HORMONE: CPT

## 2024-02-08 PROCEDURE — 85027 COMPLETE CBC AUTOMATED: CPT

## 2024-02-08 PROCEDURE — 80053 COMPREHEN METABOLIC PANEL: CPT

## 2024-02-08 PROCEDURE — 82746 ASSAY OF FOLIC ACID SERUM: CPT

## 2024-02-08 PROCEDURE — 36415 COLL VENOUS BLD VENIPUNCTURE: CPT

## 2024-02-08 PROCEDURE — 71046 X-RAY EXAM CHEST 2 VIEWS: CPT | Mod: 26

## 2024-02-08 PROCEDURE — 84484 ASSAY OF TROPONIN QUANT: CPT

## 2024-02-08 PROCEDURE — 83735 ASSAY OF MAGNESIUM: CPT

## 2024-02-08 RX ORDER — OMEGA-3 ACID ETHYL ESTERS 1 G
1 CAPSULE ORAL
Qty: 0 | Refills: 0 | DISCHARGE

## 2024-02-08 RX ORDER — LANOLIN ALCOHOL/MO/W.PET/CERES
3 CREAM (GRAM) TOPICAL AT BEDTIME
Refills: 0 | Status: DISCONTINUED | OUTPATIENT
Start: 2024-02-08 | End: 2024-02-09

## 2024-02-08 RX ORDER — SODIUM CHLORIDE 9 MG/ML
500 INJECTION INTRAMUSCULAR; INTRAVENOUS; SUBCUTANEOUS ONCE
Refills: 0 | Status: COMPLETED | OUTPATIENT
Start: 2024-02-08 | End: 2024-02-08

## 2024-02-08 RX ORDER — SODIUM CHLORIDE 9 MG/ML
1000 INJECTION INTRAMUSCULAR; INTRAVENOUS; SUBCUTANEOUS
Refills: 0 | Status: DISCONTINUED | OUTPATIENT
Start: 2024-02-08 | End: 2024-02-09

## 2024-02-08 RX ORDER — ACETAMINOPHEN 500 MG
650 TABLET ORAL EVERY 6 HOURS
Refills: 0 | Status: DISCONTINUED | OUTPATIENT
Start: 2024-02-08 | End: 2024-02-09

## 2024-02-08 RX ORDER — ONDANSETRON 8 MG/1
4 TABLET, FILM COATED ORAL EVERY 6 HOURS
Refills: 0 | Status: DISCONTINUED | OUTPATIENT
Start: 2024-02-08 | End: 2024-02-09

## 2024-02-08 RX ADMIN — SODIUM CHLORIDE 1000 MILLILITER(S): 9 INJECTION INTRAMUSCULAR; INTRAVENOUS; SUBCUTANEOUS at 16:45

## 2024-02-08 RX ADMIN — SODIUM CHLORIDE 125 MILLILITER(S): 9 INJECTION INTRAMUSCULAR; INTRAVENOUS; SUBCUTANEOUS at 23:28

## 2024-02-08 NOTE — H&P ADULT - PROBLEM/PLAN-4
[facility] PHYSICIAN SERVICES  [facility]  JERARDO FARFAN  [address] [id number]  Dept: [phone]  Dept Fax: [phone]  Loc: [phone]    Jose Keane is a 15 y.o. female who presents today for her medical conditions/complaints as noted below. Jose Keane is c/o of Follow-up (No issues or concerns. )      Chief Complaint   Patient presents with    Follow-up     No issues or concerns. HPI:     HPI  Patient here for follow up on ADHD. Patient has been off of meds for approximately 4 months and has been doing well. She has only had to use BuSpar once or twice since starting. She has been doing well in school and has all A's and B's per mom's report. Past Medical History:   Diagnosis Date    ADHD (attention deficit hyperactivity disorder)         Past Surgical History:   Procedure Laterality Date    ADENOIDECTOMY      TONSILLECTOMY AND ADENOIDECTOMY         Social History     Tobacco Use    Smoking status: Never     Passive exposure: Yes    Smokeless tobacco: Never   Substance Use Topics    Alcohol use: Never        Current Outpatient Medications   Medication Sig Dispense Refill    busPIRone (BUSPAR) 5 MG tablet        No current facility-administered medications for this visit. No Known Allergies    Family History   Problem Relation Age of Onset    No Known Problems Mother     No Known Problems Father                Subjective:      Review of Systems   Constitutional: Negative. HENT: Negative. Eyes: Negative. Respiratory: Negative. Cardiovascular: Negative. Gastrointestinal: Negative. Endocrine: Negative. Genitourinary: Negative. Musculoskeletal: Negative. Skin: Negative. Neurological: Negative. Hematological: Negative. Psychiatric/Behavioral: Negative. Objective:     Physical Exam  Vitals and nursing note reviewed. Constitutional:       Appearance: Normal appearance. HENT:      Head: Normocephalic and atraumatic.       Jaw: There is normal jaw occlusion. Right Ear: Hearing, tympanic membrane, ear canal and external ear normal.      Left Ear: Hearing, tympanic membrane, ear canal and external ear normal.      Nose: Nose normal.      Mouth/Throat:      Lips: Pink. Mouth: Mucous membranes are moist.      Pharynx: Oropharynx is clear. Eyes:      General: Lids are normal.      Extraocular Movements: Extraocular movements intact. Conjunctiva/sclera: Conjunctivae normal.      Pupils: Pupils are equal, round, and reactive to light. Neck:      Thyroid: No thyromegaly. Trachea: Trachea normal.   Cardiovascular:      Rate and Rhythm: Normal rate and regular rhythm. Pulses: Normal pulses. Dorsalis pedis pulses are 2+ on the right side and 2+ on the left side. Posterior tibial pulses are 2+ on the right side and 2+ on the left side. Heart sounds: Normal heart sounds. No murmur heard. Pulmonary:      Effort: Pulmonary effort is normal.      Breath sounds: Normal breath sounds and air entry. Abdominal:      General: Bowel sounds are normal.      Palpations: Abdomen is soft. Musculoskeletal:         General: Normal range of motion. Cervical back: Full passive range of motion without pain, normal range of motion and neck supple. Thoracic back: Normal range of motion. Lumbar back: Normal range of motion. Right lower leg: No edema. Left lower leg: No edema. Lymphadenopathy:      Cervical: No cervical adenopathy. Skin:     General: Skin is warm and dry. Capillary Refill: Capillary refill takes less than 2 seconds. Neurological:      General: No focal deficit present. Mental Status: She is alert and oriented to person, place, and time. Mental status is at baseline.    Psychiatric:         Attention and Perception: Attention normal.         Mood and Affect: Mood normal.         Speech: Speech normal.         Behavior: Behavior normal.         Thought Content: Thought content normal.         Cognition and Memory: Cognition normal.         Judgment: Judgment normal.       BP 92/64 (Site: Right Upper Arm, Position: Sitting, Cuff Size: Large Adult)   Pulse 71   Temp 99.1 °F (37.3 °C) (Temporal)   Ht 5' 3\" (1.6 m)   Wt 103 lb 6 oz (46.9 kg)   LMP 01/11/2023 (Exact Date)   SpO2 98%   BMI 18.31 kg/m²     Assessment:      Diagnosis Orders   1. Anxiety        2. Attention deficit hyperactivity disorder (ADHD), predominantly inattentive type            No results found for this visit on 01/13/23. Plan:     1. Anxiety  Will continue BuSpar as needed for any breakthrough episodes of anxiety. 2. Attention deficit hyperactivity disorder (ADHD), predominantly inattentive type  We will continue to monitor at home. May need to restart prior to next school year if symptoms reoccur. Return in about 6 months (around 7/13/2023) for Annual Physical Exam.    No orders of the defined types were placed in this encounter. No orders of the defined types were placed in this encounter. Patient offered educational handouts and has had all questions answered. Patient voices understanding and agrees to plans along with risks and benefits of plan. Patient is instructed to continue prior meds, diet, and exercise plans as instructed. Patient agrees to follow up as instructed and sooner if needed. Patient agrees to go to ER if condition becomes emergent. EMR Dragon/transcription disclaimer: Some of this encounter note is an electronic transcription/translation of spoken language to printed text. The electronic translation of spoken language may permit erroneous, or at times, nonsensical words or phrases to be inadvertently transcribed.  Although I have reviewed the note for such errors, some may still exist.    Electronically signed by JOSE E Reyna on 1/13/2023 at 4:03 PM DISPLAY PLAN FREE TEXT

## 2024-02-08 NOTE — ED PROVIDER NOTE - ENMT, MLM
Airway patent, Nasal mucosa clear. No epistaxis. Mouth with normal mucosa. Throat has no vesicles, no oropharyngeal exudates and uvula is midline.

## 2024-02-08 NOTE — ED PROVIDER NOTE - CPE EDP SKIN NORM
Chief Complaint   Patient presents with   [facility].     pt reports previous dx of covid-19 and is seeing pulm - Dr. Mallory Coon Hypertension       1. \"Have you been to the ER, urgent care clinic since your last visit? Hospitalized since your last visit? \" Yes When: 3/2022 Where: [facility] Reason for visit: SOB, leg and arm pain     2. \"Have you seen or consulted any other health care providers outside of the [address] [place] [address] since your last visit? \" No     3. For patients aged 39-70: Has the patient had a colonoscopy / FIT/ Cologuard? Yes - Care Gap present. Rooming MA/LPN to request most recent results [place]      If the patient is female:    4. For patients aged 41-77: Has the patient had a mammogram within the past 2 years? NA - based on age or sex      11. For patients aged 21-65: Has the patient had a pap smear?  NA - based on age or sex normal...

## 2024-02-08 NOTE — H&P ADULT - NSHPPHYSICALEXAM_GEN_ALL_CORE
GENERAL: NAD, comfortable at bedside   HEAD:  Atraumatic, Normocephalic  EYES: R-eye; Ecchymosis around the inner aspect  conjunctiva and sclera clear  NECK: Supple, No JVD  CHEST/LUNG: Clear to auscultation bilaterally; No wheezes, rales or rhonchi  HEART: Regular rate and rhythm; No murmurs, rubs, or gallops, (+)S1, S2  ABDOMEN: Soft, Nontender, Nondistended; Normal Bowel sounds   EXTREMITIES:  2+ Peripheral Pulses, No clubbing, cyanosis, or edema. Contraction fo the toes. Ulnar deviation of the fingers/  PSYCH: normal mood and affect  NEUROLOGY: AAOx3, non-focal  SKIN: No rashes or lesions

## 2024-02-08 NOTE — ED ADULT NURSE REASSESSMENT NOTE - NS ED NURSE REASSESS COMMENT FT1
Pt. received from previous RN in stable condition, no s/sx of resp. distress/pain.  Able to make all needs known, family at bedside advocating for pt.  VSS, safety maintained, and emotional support provided.  Call bell within reach. Report given to 3E, awaiting transport.
received handoff report from Humza MOREIRA. safety precautions and comfort measures maintained. patient resting comfortably in bed. pending bed placement.
Patient resting at present family at bedside tolerating po.  Right IV flushed and patent no signs of redness, swelling or infiltration noted.

## 2024-02-08 NOTE — H&P ADULT - ASSESSMENT
89 year old female with a PMH of lyme disease, varicose veins presents to the ED for evaluation. As per daughter, patient may have had a syncopal episode. Patient endorses 2 days ago, she woke up and was seeing things around the room with black spots all over, everything looked very messy and disorganized. She was not able to recall what exactly happened, unsure if she fell,  Yesterday was feeling unwell, called her daughter today to inform her of the incident which prompted ED visit.  Endorses she did not have another episode since Tuesday. Upon evaluation patient has ecchymosis around the R-eye. Denies headache, dizziness, vision changes, chest pain, palpitation, N/V/D, urinary symptoms or any other complaints. Labs remarkable for WBC:12.22, CR: 2.97, CK:250, Ma.7 Trop: 63.12-->58.03. Vitals Stable BP:128/88 HR:87, afebrile. Received 500ccNS,   CT head: No acute intracranial hemorrhage or mass effect. CT maxillofacial: No acute facial bone fracture. CT cervical spine: No evidence for acute displaced fracture or malalignment.

## 2024-02-08 NOTE — PATIENT PROFILE ADULT - FUNCTIONAL ASSESSMENT - BASIC MOBILITY 6.
3-calculated by average/Not able to assess (calculate score using [facility] averaging method)

## 2024-02-08 NOTE — PHARMACOTHERAPY INTERVENTION NOTE - COMMENTS
Medication history complete, reviewed medications with patient daughter at bedside and confirmed with doctor first med hx. Per daughter patient is not on meds at home.

## 2024-02-08 NOTE — ED PROVIDER NOTE - EYES, MLM
Clear bilaterally, pupils equal, round and reactive to light. Ecchymosis around right eye. No pain with EOMI.

## 2024-02-08 NOTE — ED PROVIDER NOTE - CARE PLAN
Principal Discharge DX:	Syncope  Secondary Diagnosis:	LAN (acute kidney injury)  Secondary Diagnosis:	Elevated troponin   1

## 2024-02-08 NOTE — ED ADULT NURSE NOTE - OBJECTIVE STATEMENT
Patient Had an episode 2 days ago.  Patient lives alone and was not able to state what happened.  Room was found in a state of disorder Patient has ecchymosis  to her right eye.  Patient states she had "blackout" family states she does not drink alcohol and only takes vitamins.  No primary physician at this time. Hx of cellulitis admitted to [facility] about 8 months ago.  Patient is alert to person, place and date.  Patient's saw MANINE Ramirez for check up April of 23. Daughter states that she still seems a little disoriented.

## 2024-02-08 NOTE — H&P ADULT - HISTORY OF PRESENT ILLNESS
89 year old female with a PMH of lyme disease, varicose veins presents to the ED for evaluation. As per daughter, patient may have had a syncopal episode. Patient endorses 2 days ago, she woke up and was seeing things around the room with black spots all over, everything looked very messy and disorganized. She was not able to recall what exactly happened, unsure if she fell,  Yesterday was feeling unwell, called her daughter today to inform her of the incident which prompted ED visit.  Endorses she did not have another episode since Tuesday. Upon evaluation patient has ecchymosis around the R-eye.            89 year old female with a PMH of lyme disease, varicose veins presents to the ED for evaluation. As per daughter, patient may have had a syncopal episode. Patient endorses 2 days ago, she woke up and was seeing things around the room with black spots all over, everything looked very messy and disorganized. She was not able to recall what exactly happened, unsure if she fell,  Yesterday was feeling unwell, called her daughter today to inform her of the incident which prompted ED visit.  Endorses she did not have another episode since Tuesday. Upon evaluation patient has ecchymosis around the R-eye. Denies headache, dizziness, vision changes, chest pain, palpitation, N/V/D, urinary symptoms or any other complaints. Labs remarkable for WBC:12.22, CR: 2.97, CK:250, Ma.7 Trop: 63.12-->58.03. Vitals Stable BP:128/88 HR:87, afebrile. Received 500ccNS,   CT head: No acute intracranial hemorrhage or mass effect. CT maxillofacial: No acute facial bone fracture. CT cervical spine: No evidence for acute displaced fracture or malalignment.

## 2024-02-08 NOTE — H&P ADULT - NSHPLABSRESULTS_GEN_ALL_CORE
14.0   18.81 )-----------( 265      ( 08 Feb 2024 20:14 )             42.2     132<L>  |  98  |  55<H>  ----------------------------<  139<H>     02-08  3.8   |  28  |  2.71<H>    Ca    9.3      08 Feb 2024 18:49  Phos  4.3     02-08  Mg     2.7     02-08    TPro  7.0  /  Alb  3.6  /  TBili  0.8  /  DBili  x   /  AST  25  /  ALT  24  /  AlkPhos  55  02-08      CT head: No acute intracranial hemorrhage or mass effect.    CT maxillofacial: No acute facial bone fracture.    CT cervical spine:  1. No evidence for acute displaced fracture or malalignment.  2. Suggestion of a trace right apical pneumothorax. Recommend CT chest. 14.0   18.81 )-----------( 265      ( 08 Feb 2024 20:14 )             42.2     132<L>  |  98  |  55<H>  ----------------------------<  139<H>     02-08  3.8   |  28  |  2.71<H>    Ca    9.3      08 Feb 2024 18:49  Phos  4.3     02-08  Mg     2.7     02-08    TPro  7.0  /  Alb  3.6  /  TBili  0.8  /  DBili  x   /  AST  25  /  ALT  24  /  AlkPhos  55  02-08      CT head: No acute intracranial hemorrhage or mass effect.    CT maxillofacial: No acute facial bone fracture.    CT cervical spine:  1. No evidence for acute displaced fracture or malalignment.  2. Suggestion of a trace right apical pneumothorax. Recommend CT chest.    CT-chest:   No pneumothorax or other acute traumatic injury.  Unchanged mild pulmonary fibrosis.  Gallstones and nonobstructing right renal stones.    CXR:  1. No evidence of thoracic trauma.  2. Mild linear scarring at the left lung base, unchanged.    Pelvic Xray   1. No evidence of acute fracture or dislocation.  2. Degenerative osteoarthritis of both hips, both SI joints and there is severe lower lumbar facet arthropathy.

## 2024-02-08 NOTE — H&P ADULT - PROBLEM SELECTOR PLAN 1
Unwitnessed syncopal 2 days ago   - Tele  - Fall precaution   - Orthostatic vitals  - Neuro-checks Q4hrs   - Echo   - Carotid doppler   - DVT Prophylaxis   - F/u labs

## 2024-02-08 NOTE — ED ADULT TRIAGE NOTE - CHIEF COMPLAINT QUOTE
Pt A&OX3, BIBEMS with c/o generalized weakness. EMS reports pt had an unwitnessed fall 2 days ago. Lost consciousness, when she came to she was disoriented and couldn't remember what happened. Spoke to her daughters this morning who called the ambulance.   Denies CP, SOB, fevers, nausea, vomiting or dizziness.

## 2024-02-08 NOTE — PATIENT PROFILE ADULT - FALL HARM RISK - HARM RISK INTERVENTIONS

## 2024-02-09 ENCOUNTER — TRANSCRIPTION ENCOUNTER (OUTPATIENT)
Age: 89
End: 2024-02-09

## 2024-02-09 VITALS — WEIGHT: 132.28 LBS

## 2024-02-09 DIAGNOSIS — R55 SYNCOPE AND COLLAPSE: ICD-10-CM

## 2024-02-09 DIAGNOSIS — N17.9 ACUTE KIDNEY FAILURE, UNSPECIFIED: ICD-10-CM

## 2024-02-09 DIAGNOSIS — D72.829 ELEVATED WHITE BLOOD CELL COUNT, UNSPECIFIED: ICD-10-CM

## 2024-02-09 DIAGNOSIS — R79.89 OTHER SPECIFIED ABNORMAL FINDINGS OF BLOOD CHEMISTRY: ICD-10-CM

## 2024-02-09 LAB
A1C WITH ESTIMATED AVERAGE GLUCOSE RESULT: 5.7 % — HIGH (ref 4–5.6)
ALBUMIN SERPL ELPH-MCNC: 2.8 G/DL — LOW (ref 3.3–5)
ALP SERPL-CCNC: 52 U/L — SIGNIFICANT CHANGE UP (ref 40–120)
ALT FLD-CCNC: 20 U/L — SIGNIFICANT CHANGE UP (ref 12–78)
ANION GAP SERPL CALC-SCNC: 4 MMOL/L — LOW (ref 5–17)
APPEARANCE UR: CLEAR — SIGNIFICANT CHANGE UP
AST SERPL-CCNC: 24 U/L — SIGNIFICANT CHANGE UP (ref 15–37)
BACTERIA # UR AUTO: NEGATIVE /HPF — SIGNIFICANT CHANGE UP
BILIRUB SERPL-MCNC: 0.8 MG/DL — SIGNIFICANT CHANGE UP (ref 0.2–1.2)
BILIRUB UR-MCNC: NEGATIVE — SIGNIFICANT CHANGE UP
BUN SERPL-MCNC: 49 MG/DL — HIGH (ref 7–23)
CALCIUM SERPL-MCNC: 8.8 MG/DL — SIGNIFICANT CHANGE UP (ref 8.5–10.1)
CAST: 2 /LPF — SIGNIFICANT CHANGE UP (ref 0–4)
CHLORIDE SERPL-SCNC: 103 MMOL/L — SIGNIFICANT CHANGE UP (ref 96–108)
CHOLEST SERPL-MCNC: 148 MG/DL — SIGNIFICANT CHANGE UP
CO2 SERPL-SCNC: 30 MMOL/L — SIGNIFICANT CHANGE UP (ref 22–31)
COLOR SPEC: YELLOW — SIGNIFICANT CHANGE UP
CREAT SERPL-MCNC: 1.95 MG/DL — HIGH (ref 0.5–1.3)
DIFF PNL FLD: NEGATIVE — SIGNIFICANT CHANGE UP
EGFR: 24 ML/MIN/1.73M2 — LOW
ESTIMATED AVERAGE GLUCOSE: 117 MG/DL — HIGH (ref 68–114)
FOLATE SERPL-MCNC: >20 NG/ML — SIGNIFICANT CHANGE UP
GLUCOSE SERPL-MCNC: 97 MG/DL — SIGNIFICANT CHANGE UP (ref 70–99)
GLUCOSE UR QL: NEGATIVE MG/DL — SIGNIFICANT CHANGE UP
HCT VFR BLD CALC: 39.8 % — SIGNIFICANT CHANGE UP (ref 34.5–45)
HDLC SERPL-MCNC: 35 MG/DL — LOW
HGB BLD-MCNC: 12.9 G/DL — SIGNIFICANT CHANGE UP (ref 11.5–15.5)
KETONES UR-MCNC: NEGATIVE MG/DL — SIGNIFICANT CHANGE UP
LEUKOCYTE ESTERASE UR-ACNC: ABNORMAL
LIPID PNL WITH DIRECT LDL SERPL: 90 MG/DL — SIGNIFICANT CHANGE UP
MAGNESIUM SERPL-MCNC: 2.4 MG/DL — SIGNIFICANT CHANGE UP (ref 1.6–2.6)
MCHC RBC-ENTMCNC: 30.8 PG — SIGNIFICANT CHANGE UP (ref 27–34)
MCHC RBC-ENTMCNC: 32.4 GM/DL — SIGNIFICANT CHANGE UP (ref 32–36)
MCV RBC AUTO: 95 FL — SIGNIFICANT CHANGE UP (ref 80–100)
NITRITE UR-MCNC: NEGATIVE — SIGNIFICANT CHANGE UP
NON HDL CHOLESTEROL: 113 MG/DL — SIGNIFICANT CHANGE UP
PH UR: 5 — SIGNIFICANT CHANGE UP (ref 5–8)
PLATELET # BLD AUTO: 254 K/UL — SIGNIFICANT CHANGE UP (ref 150–400)
POTASSIUM SERPL-MCNC: 3.8 MMOL/L — SIGNIFICANT CHANGE UP (ref 3.5–5.3)
POTASSIUM SERPL-SCNC: 3.8 MMOL/L — SIGNIFICANT CHANGE UP (ref 3.5–5.3)
PROT SERPL-MCNC: 6 GM/DL — SIGNIFICANT CHANGE UP (ref 6–8.3)
PROT UR-MCNC: 30 MG/DL
RBC # BLD: 4.19 M/UL — SIGNIFICANT CHANGE UP (ref 3.8–5.2)
RBC # FLD: 13.2 % — SIGNIFICANT CHANGE UP (ref 10.3–14.5)
RBC CASTS # UR COMP ASSIST: 0 /HPF — SIGNIFICANT CHANGE UP (ref 0–4)
SODIUM SERPL-SCNC: 137 MMOL/L — SIGNIFICANT CHANGE UP (ref 135–145)
SP GR SPEC: 1.01 — SIGNIFICANT CHANGE UP (ref 1–1.03)
SQUAMOUS # UR AUTO: 0 /HPF — SIGNIFICANT CHANGE UP (ref 0–5)
TRIGL SERPL-MCNC: 122 MG/DL — SIGNIFICANT CHANGE UP
TSH SERPL-MCNC: 2.91 UU/ML — SIGNIFICANT CHANGE UP (ref 0.34–4.82)
UROBILINOGEN FLD QL: 0.2 MG/DL — SIGNIFICANT CHANGE UP (ref 0.2–1)
VIT B12 SERPL-MCNC: 504 PG/ML — SIGNIFICANT CHANGE UP (ref 232–1245)
WBC # BLD: 11.43 K/UL — HIGH (ref 3.8–10.5)
WBC # FLD AUTO: 11.43 K/UL — HIGH (ref 3.8–10.5)
WBC UR QL: 2 /HPF — SIGNIFICANT CHANGE UP (ref 0–5)

## 2024-02-09 PROCEDURE — 93880 EXTRACRANIAL BILAT STUDY: CPT | Mod: 26

## 2024-02-09 PROCEDURE — 93306 TTE W/DOPPLER COMPLETE: CPT | Mod: 26

## 2024-02-09 PROCEDURE — 99239 HOSP IP/OBS DSCHRG MGMT >30: CPT

## 2024-02-09 RX ORDER — ACETAMINOPHEN 500 MG
15 TABLET ORAL
Qty: 237 | Refills: 0
Start: 2024-02-09

## 2024-02-09 NOTE — DISCHARGE NOTE NURSING/CASE MANAGEMENT/SOCIAL WORK - PATIENT PORTAL LINK FT
You can access the FollowMyHealth Patient Portal offered by [facility] by registering at the following website: [email or web address]. By joining [employer] [employer]’s FollowMyHealth portal, you will also be able to view your health information using other applications (apps) compatible with our system.

## 2024-02-09 NOTE — PHYSICAL THERAPY INITIAL EVALUATION ADULT - MODALITIES TREATMENT COMMENTS
pt left in bed supine post Eval; bed alarm on; HM intact; son Lavon present; callbell in reach; pt instructed not to get up alone; call nursing for assist; ezequiel well; denied pain

## 2024-02-09 NOTE — DISCHARGE NOTE PROVIDER - NSDCMRMEDTOKEN_GEN_ALL_CORE_FT
acetaminophen 500 mg/15 mL oral liquid: 15 milliliter(s) orally every 6 hours as needed for as needed for pain

## 2024-02-09 NOTE — DISCHARGE NOTE PROVIDER - NSDCCPCAREPLAN_GEN_ALL_CORE_FT
PRINCIPAL DISCHARGE DIAGNOSIS  Diagnosis: Syncope  Assessment and Plan of Treatment: You were evaluated and treated in the hospital following an episode of likely syncope. Appears to be due to orthostasis, dehydration. You were given intravenous fluid hydration and you are now feeling improved. Orthostatic vital signs are now negative. Kidney function is steadily improving. No signs of infection or bleeding. No sign of arrhythmia, heart attack, heart failure or other acute cardiac condition. Neurologically intact. Neuroimaging negative. No sign of stroke or seizure. Recommend close outpatient follow up with Primary Care for reassessment. Will refer to Cardiology as outpatient for possible MCOT/Zio patch for continued cardiac monitoring. Please avoid NSAIDs for now (ibuprofen, advil, motrin, naproxen) as your kidneys are recoving from injury, see below).      SECONDARY DISCHARGE DIAGNOSES  Diagnosis: LAN (acute kidney injury)  Assessment and Plan of Treatment: Suspect underlying chronic kidney disease with superimposed acute component of kidney injury, likely due to dehydration, also home NSAID use (naproxen, ibuprofen etc). Kidney function is now improving steadily with intravenous fluid hydration. Voiding well. No urinary complaints. Urinalysis unremarkable. No sign of urinary infection. Recommend repeat chemistry labs (blood work) with Primary Care in 1 week to re-assess.    Diagnosis: Elevated troponin  Assessment and Plan of Treatment: Minimally elevated troponin. Likely myocardial demand without infarction and superimposed decreased renal clearance. No angina. No congestive heart failure signs or symptoms. EKG without ischemic changes. TTE done. Outpatient Cardiology follow up. Referred to Dr. Bañuelos.

## 2024-02-09 NOTE — DISCHARGE NOTE NURSING/CASE MANAGEMENT/SOCIAL WORK - NSDCVIVACCINE_GEN_ALL_CORE_FT
Tdap; 09-Oct-2018 13:37; Marychuy Huffman (RN); [employer]; [id number] (Exp. Date: 13-Jun-2020); IntraMuscular; Deltoid Right.; 0.5 milliLiter(s); VIS (VIS Published: 09-May-2013, VIS Presented: 09-Oct-2018);

## 2024-02-09 NOTE — DIETITIAN INITIAL EVALUATION ADULT - ADD RECOMMEND
Maintain soft and bite-sized diet  MVI w/ minerals daily to ensure 100% RDA met   Record PO intake in EMR after each meal (nursing.)   Consider adding thiamine 100 mg daily 2/2 poor PO intake/ malnutrition  Monitor bowel movements, if no BM for >3 days, consider implementing bowel regimen.  Confirm Goals of Care regarding nutrition support   monitor ability to swallow  Monitor PO intake, tolerance, labs and weight.

## 2024-02-09 NOTE — DIETITIAN INITIAL EVALUATION ADULT - HEIGHT FOR BMI (CENTIMETERS)
Handoff to receiving nurse was performed and questions were answered. Vital signs noted. Patient stable.  Comments:   165.1

## 2024-02-09 NOTE — DISCHARGE NOTE PROVIDER - PROVIDER TOKENS
PROVIDER:[TOKEN:[[id number]],FOLLOWUP:[1 week]],PROVIDER:[TOKEN:[[id number]],FOLLOWUP:[1 week]]

## 2024-02-09 NOTE — DIETITIAN INITIAL EVALUATION ADULT - PROBLEM SELECTOR PLAN 1
2 mm on 3/2017 exam, 3 mm now, both very small and not typically significant. Usual follow up is a 3 to 6 month dedicated CT. No future appointments.  We can set up appointment to discuss, or do CT lung nodule in 3 months Unwitnessed syncopal 2 days ago   - Tele  - Fall precaution   - Orthostatic vitals  - Neuro-checks Q4hrs   - Echo   - Carotid doppler   - DVT Prophylaxis   - F/u labs

## 2024-02-09 NOTE — DISCHARGE NOTE PROVIDER - HOSPITAL COURSE
89 year old woman with varicose veins, Lyme disease, presented to ED for further evaluation following an episode of syncope. Patient had poor recall regarding the event, not sure exactly what happened, was unsure whether she fell but dud have some R periorbital ecchymosis. In the ED, she did not have any acute complaints. No headache. No change in vision or speech. No focal weakness or numbness. No dizziness. No chest pain or tightness. No lightheadedness or palpitations. No abdominal complaints or urinary complaints. ED vitals WNL. Exam unremarkable aside for the described ecchymosis. Neurologically she was intact. WBC 19. Hgb 15.2. Plt 303. Na134. K4.9. Cl 98. CO2 31. BUN 57. Cr 2.97. Gluc 115. LFTs WNL. Troponin 63, 58. . EKG NSR, rate WNL, no acute ischemic changes. COVID19, RSV and Flu PCR negative. CT head with no acute intracranial hemorrhage, mass, mass effect, midline shift or acute territorial infarction. CT maxillofacial with no acute facial bone fracture. CT C spine with no evidence for acute displaced fracture or malalignment. CT chest with no pneumothorax or other acute traumatic injury. Unchanged mild pulmonary fibrosis. Gallstones and nonobstructing right renal stones. Pelvis XR with no evidence of acute fracture or dislocation. Degenerative osteoarthritis of both hips, both SI joints and there was severe lower lumbar facet arthropathy. Patient was given IV fluid hydration and admitted to Medicine.     Syncope  Appears to be due to orthostasis, dehydration. Patient was given IV fluid hydration and is now feeling improved. Orthostatic vital signs now negative. Renal function improving. No signs of infection or bleeding. No sign of arrhythmia, heart attack, heart failure or other acute cardiac condition. Neurologically intact. Neuroimaging negative. No sign of stroke or seizure. Recommend close outpatient follow up with Primary Care for reassessment. Will refer to Cardiology as outpatient for possible MCOT/Zio patch for continued cardiac monitoring.    Acute kidney injury  Suspect underlying CKD with acute component of LAN, likely due to dehydration, also home NSAID use. Cr now improving steadily with IV fluid hydration. Baseline Cr unclear. Voiding well. No urinary complaints. UA unremarkable. Recommend repeat chemistry labs with Primary Care in 1 week.      Minimally elevated troponin  Likely myocardial demand without infarction. No angina. No CHF sx. EKG without ischemic changes. TTE done.       Discharge Exam:  Afebrile  BP 120s/50s-60s  HR: 70s   RR 17  O2 93-97% on room air  Gen: Comfortable  HEENT: NCAT PERRL EOMI MMM clear oropharynx  Neck: Supple, no JVD, no LAD  CVS: s1 s2 normal, RRR  Chest: Normal resp effort  Abd: +BS, soft NT ND   Ext: No edema or calf tenderness, normal peripheral pulses, normal capillary refill  Skin: Warm, dry  Mood: Calm, pleasant  Neuro: A+OX3, speech fluent, no aphasia. CN intact. Strength 5/5 UEs and LEs. No drift. Sensation intact to light touch. Reflexes symmetric. Plantars downgoing. No FNFA. Gait steady.     Labs:                        12.9   11.43 )--------( 254                   39.8       137  |  103  |  49  -----------------------<  97  3.8   |  30  |  1.95    Ca  8.8      Phos  4.3   Mg 2.4    TPro  6.0  /  Alb  2.8  /  TBili  0.8  /  DBili  x   /  AST  24  /  ALT  20  /  AlkPhos  52      Troponin 63, 58      A1c 5.7.  LDL 90     TSH WNL      B12 WNL    COVID19 PCR negative  Flu PCR negative  RSV PCR negative    Imaging:  CT head with no acute intracranial hemorrhage, mass, mass effect, midline shift or acute territorial infarction.     CT maxillofacial with no acute facial bone fracture.    CT C spine with no evidence for acute displaced fracture or malalignment.    CT chest with no pneumothorax or other acute traumatic injury. Unchanged mild pulmonary fibrosis. Gallstones and nonobstructing right renal stones.     Pelvis XR with no evidence of acute fracture or dislocation. Degenerative osteoarthritis of both hips, both SI joints and there was severe lower lumbar facet arthropathy.     US arterial duplex carotid artery B/L 2/9: No significant hemodynamic stenosis of either carotid artery.     Cardiac Testing:  TTE 2/9: Performed.     Tele 2/8-2/9: No events    EKG 2/8: NSR, rate 87, no acute ischemic changes

## 2024-02-09 NOTE — DIETITIAN INITIAL EVALUATION ADULT - ORAL INTAKE PTA/DIET HISTORY
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. Pt cooks two meals a day, PO intake as per pt's dietary recall estimated < 75% ENN > one month

## 2024-02-09 NOTE — DIETITIAN INITIAL EVALUATION ADULT - PERTINENT LABORATORY DATA
02-09    137  |  103  |  49<H>  ----------------------------<  97  3.8   |  30  |  1.95<H>    Ca    8.8      09 Feb 2024 06:32  Phos  4.3     02-08  Mg     2.4     02-09    TPro  6.0  /  Alb  2.8<L>  /  TBili  0.8  /  DBili  x   /  AST  24  /  ALT  20  /  AlkPhos  52  02-09

## 2024-02-09 NOTE — DIETITIAN INITIAL EVALUATION ADULT - OTHER INFO
Pt A&OX3, BIBEMS with c/o generalized weakness. EMS reports pt had an unwitnessed fall 2 days ago. Lost consciousness, when she came to she was disoriented and couldn't remember what happened. Spoke to her daughters this morning who called the ambulance.   Denies CP, SOB, fevers, nausea, vomiting or dizziness.  see chief complaint quote   Pt A&OX3, BIBEMS with c/o generalized weakness. EMS reports pt had an unwitnessed fall 2 days ago. Lost consciousness, when she came to she was disoriented and couldn't remember what happened. Spoke to her daughters this morning who called the ambulance.   Denies CP, SOB, fevers, nausea, vomiting or dizziness.  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

## 2024-02-09 NOTE — DISCHARGE NOTE PROVIDER - CARE PROVIDER_API CALL
Noelle Diamond.  Family Medicine  [address] [place] [address]  Phone: [phone]  Fax: [phone]  Follow Up Time: 1 week    Stacey Bañuelos  Cardiology  [address] [place] [id number]  Phone: [phone]  Fax: [phone]  Follow Up Time: 1 week

## 2024-02-09 NOTE — PHYSICAL THERAPY INITIAL EVALUATION ADULT - GENERAL OBSERVATIONS, REHAB EVAL
IV D/C by RN [place]; HM; pt rec'd in bed supine; HR 77; son Lavon present; R zeferino-orbital bruising noted; pt denied pain

## 2024-02-09 NOTE — DIETITIAN INITIAL EVALUATION ADULT - PERTINENT MEDS FT
MEDICATIONS  (STANDING):    MEDICATIONS  (PRN):  acetaminophen     Tablet .. 650 milliGRAM(s) Oral every 6 hours PRN Mild Pain (1 - 3)  aluminum hydroxide/magnesium hydroxide/simethicone Suspension 30 milliLiter(s) Oral every 4 hours PRN Dyspepsia  melatonin 3 milliGRAM(s) Oral at bedtime PRN Insomnia  ondansetron Injectable 4 milliGRAM(s) IV Push every 6 hours PRN Nausea and/or Vomiting

## 2024-02-10 LAB
CULTURE RESULTS: SIGNIFICANT CHANGE UP
SPECIMEN SOURCE: SIGNIFICANT CHANGE UP

## 2024-02-16 DIAGNOSIS — Z88.1 ALLERGY STATUS TO OTHER ANTIBIOTIC AGENTS STATUS: ICD-10-CM

## 2024-02-16 DIAGNOSIS — E86.0 DEHYDRATION: ICD-10-CM

## 2024-02-16 DIAGNOSIS — J84.10 PULMONARY FIBROSIS, UNSPECIFIED: ICD-10-CM

## 2024-02-16 DIAGNOSIS — A69.20 LYME DISEASE, UNSPECIFIED: ICD-10-CM

## 2024-02-16 DIAGNOSIS — I83.90 ASYMPTOMATIC VARICOSE VEINS OF UNSPECIFIED LOWER EXTREMITY: ICD-10-CM

## 2024-02-16 DIAGNOSIS — Z88.0 ALLERGY STATUS TO PENICILLIN: ICD-10-CM

## 2024-02-16 DIAGNOSIS — I37.1 NONRHEUMATIC PULMONARY VALVE INSUFFICIENCY: ICD-10-CM

## 2024-02-16 DIAGNOSIS — R79.89 OTHER SPECIFIED ABNORMAL FINDINGS OF BLOOD CHEMISTRY: ICD-10-CM

## 2024-02-16 DIAGNOSIS — D72.829 ELEVATED WHITE BLOOD CELL COUNT, UNSPECIFIED: ICD-10-CM

## 2024-02-16 DIAGNOSIS — I34.0 NONRHEUMATIC MITRAL (VALVE) INSUFFICIENCY: ICD-10-CM

## 2024-02-16 DIAGNOSIS — N17.9 ACUTE KIDNEY FAILURE, UNSPECIFIED: ICD-10-CM

## 2024-02-16 DIAGNOSIS — N20.0 CALCULUS OF KIDNEY: ICD-10-CM

## 2024-02-16 DIAGNOSIS — N18.9 CHRONIC KIDNEY DISEASE, UNSPECIFIED: ICD-10-CM

## 2024-03-20 ENCOUNTER — APPOINTMENT (OUTPATIENT)
Dept: OTOLARYNGOLOGY | Facility: CLINIC | Age: 89
End: 2024-03-20
Payer: MEDICARE

## 2024-03-20 VITALS — WEIGHT: 130 LBS | BODY MASS INDEX: 21.66 KG/M2 | HEIGHT: 65 IN

## 2024-03-20 DIAGNOSIS — R26.89 OTHER ABNORMALITIES OF GAIT AND MOBILITY: ICD-10-CM

## 2024-03-20 DIAGNOSIS — H90.3 SENSORINEURAL HEARING LOSS, BILATERAL: ICD-10-CM

## 2024-03-20 DIAGNOSIS — H69.93 UNSPECIFIED EUSTACHIAN TUBE DISORDER, BILATERAL: ICD-10-CM

## 2024-03-20 DIAGNOSIS — H61.23 IMPACTED CERUMEN, BILATERAL: ICD-10-CM

## 2024-03-20 PROCEDURE — 69210 REMOVE IMPACTED EAR WAX UNI: CPT

## 2024-03-20 PROCEDURE — 99204 OFFICE O/P NEW MOD 45 MIN: CPT | Mod: 25

## 2024-03-20 PROCEDURE — 92557 COMPREHENSIVE HEARING TEST: CPT

## 2024-03-20 PROCEDURE — 92567 TYMPANOMETRY: CPT

## 2024-03-20 NOTE — REVIEW OF SYSTEMS
[Hearing Loss] : hearing loss [Patient Intake Form Reviewed] : Patient intake form was reviewed [Negative] : Ear [de-identified] : left ear

## 2024-03-20 NOTE — HISTORY OF PRESENT ILLNESS
[de-identified] : co reduced hearing ad and left ear plugging co imbalance neg pmh re ears neg hx cva

## 2025-03-21 NOTE — DISCHARGE NOTE ADULT - NSCORESITESY/N_GEN_A_CORE_RD
Problem: Patient Stated Problem: Deconditioning/retain ability to walk around the block  Goal: Patient Stated Goal: Increase physical activity/exercise-long term   Outcome: Progressing  Intervention: First Patient Stated Intervention: Start physical therapy in January 2025  Note: Physical therapy referral ordered on 3/13      Yes

## 2025-08-07 ENCOUNTER — OFFICE (OUTPATIENT)
Dept: URBAN - METROPOLITAN AREA CLINIC 102 | Facility: CLINIC | Age: OVER 89
Setting detail: OPHTHALMOLOGY
End: 2025-08-07
Payer: MEDICARE

## 2025-08-07 DIAGNOSIS — H04.563: ICD-10-CM

## 2025-08-07 DIAGNOSIS — Z96.1: ICD-10-CM

## 2025-08-07 DIAGNOSIS — H40.031: ICD-10-CM

## 2025-08-07 DIAGNOSIS — H25.11: ICD-10-CM

## 2025-08-07 PROCEDURE — 92004 COMPRE OPH EXAM NEW PT 1/>: CPT | Performed by: OPHTHALMOLOGY

## 2025-08-07 PROCEDURE — 92020 GONIOSCOPY: CPT | Performed by: OPHTHALMOLOGY

## 2025-08-07 ASSESSMENT — REFRACTION_MANIFEST
OD_VA1: 20/30-2
OD_CYLINDER: -2.00
OS_VA1: 20/150
OD_AXIS: 89
OD_SPHERE: +1.25

## 2025-08-07 ASSESSMENT — CONFRONTATIONAL VISUAL FIELD TEST (CVF)
OD_FINDINGS: FULL
OS_FINDINGS: FULL

## 2025-08-07 ASSESSMENT — REFRACTION_AUTOREFRACTION
OS_SPHERE: -0.25
OD_SPHERE: -3.00
OD_CYLINDER: SPHERE
OS_AXIS: 061
OS_CYLINDER: -1.00

## 2025-08-07 ASSESSMENT — TONOMETRY
OS_IOP_MMHG: 15
OD_IOP_MMHG: 20

## 2025-08-07 ASSESSMENT — LID POSITION - LOWER LID LAG
OS_LOWER_LID_LAG: 1+ 2+
OD_LOWER_LID_LAG: 1+ 2+

## 2025-08-07 ASSESSMENT — VISUAL ACUITY
OS_BCVA: 20/HM
OD_BCVA: 20/40-2

## 2025-08-07 ASSESSMENT — KERATOMETRY
OS_K2POWER_DIOPTERS: 46.00
OD_K1POWER_DIOPTERS: 42.25
OS_K1POWER_DIOPTERS: 44.00
OD_AXISANGLE_DEGREES: 173
OD_K2POWER_DIOPTERS: 45.50
OS_AXISANGLE_DEGREES: 167

## 2025-08-07 ASSESSMENT — PUNCTAL DISCHARGE
OD_PUNCTAL_DISCHARGE: ABSENT
OS_PUNCTAL_DISCHARGE: ABSENT

## 2025-08-12 ENCOUNTER — OFFICE (OUTPATIENT)
Dept: URBAN - METROPOLITAN AREA CLINIC 70 | Facility: CLINIC | Age: OVER 89
Setting detail: OPHTHALMOLOGY
End: 2025-08-12
Payer: MEDICARE

## 2025-08-12 DIAGNOSIS — Z96.1: ICD-10-CM

## 2025-08-12 DIAGNOSIS — H57.03: ICD-10-CM

## 2025-08-12 DIAGNOSIS — H25.89: ICD-10-CM

## 2025-08-12 DIAGNOSIS — H25.11: ICD-10-CM

## 2025-08-12 DIAGNOSIS — H26.20: ICD-10-CM

## 2025-08-12 DIAGNOSIS — H04.563: ICD-10-CM

## 2025-08-12 DIAGNOSIS — H40.031: ICD-10-CM

## 2025-08-12 DIAGNOSIS — H25.13: ICD-10-CM

## 2025-08-12 PROCEDURE — 92136 OPHTHALMIC BIOMETRY: CPT | Mod: TC | Performed by: OPHTHALMOLOGY

## 2025-08-12 PROCEDURE — 92136 OPHTHALMIC BIOMETRY: CPT | Mod: 26,RT | Performed by: OPHTHALMOLOGY

## 2025-08-12 PROCEDURE — 92014 COMPRE OPH EXAM EST PT 1/>: CPT | Performed by: OPHTHALMOLOGY

## 2025-08-12 ASSESSMENT — KERATOMETRY
OS_CYLAXISANGLE_DEGREES: 167
OS_K1K2_AVERAGE: 45
OS_K1POWER_DIOPTERS: 44.00
OS_K2POWER_DIOPTERS: 46.00
OS_AXISANGLE_DEGREES: 167
OD_K1POWER_DIOPTERS: 42.75
OD_AXISANGLE2_DEGREES: 175
OS_CYLPOWER_DEGREES: 2
OD_CYLAXISANGLE_DEGREES: 175
OD_AXISANGLE_DEGREES: 85
OD_CYLPOWER_DEGREES: 2.75
OS_AXISANGLE_DEGREES: 77
OD_K2POWER_DIOPTERS: 45.50
OS_K2POWER_DIOPTERS: 46.00
OD_K1POWER_DIOPTERS: 42.75
OD_AXISANGLE_DEGREES: 175
OD_K2POWER_DIOPTERS: 45.50
OD_K1K2_AVERAGE: 44.125
OS_K1POWER_DIOPTERS: 44.00
OS_AXISANGLE2_DEGREES: 167

## 2025-08-12 ASSESSMENT — CONFRONTATIONAL VISUAL FIELD TEST (CVF)
OS_FINDINGS: FULL
OD_FINDINGS: FULL

## 2025-08-12 ASSESSMENT — REFRACTION_AUTOREFRACTION
OD_SPHERE: UNABLE
OS_CYLINDER: -0.75
OS_SPHERE: -0.25
OS_AXIS: 061

## 2025-08-12 ASSESSMENT — LID POSITION - LOWER LID LAG
OS_LOWER_LID_LAG: 1+ 2+
OD_LOWER_LID_LAG: 1+ 2+

## 2025-08-12 ASSESSMENT — VISUAL ACUITY
OD_BCVA: 20/25+2
OS_BCVA: LP

## 2025-08-12 ASSESSMENT — REFRACTION_MANIFEST
OS_SPHERE: -0.25
OS_VA1: 20/20
OS_CYLINDER: -0.75
OD_CYLINDER: SPHERE
OD_VA1: 20/NI
OD_SPHERE: -3.00
OS_AXIS: 060

## 2025-08-12 ASSESSMENT — PUNCTAL DISCHARGE
OS_PUNCTAL_DISCHARGE: ABSENT
OD_PUNCTAL_DISCHARGE: ABSENT